# Patient Record
Sex: FEMALE | Race: WHITE | Employment: OTHER | URBAN - METROPOLITAN AREA
[De-identification: names, ages, dates, MRNs, and addresses within clinical notes are randomized per-mention and may not be internally consistent; named-entity substitution may affect disease eponyms.]

---

## 2017-03-27 ENCOUNTER — OFFICE VISIT (OUTPATIENT)
Dept: PULMONOLOGY | Age: 52
End: 2017-03-27
Payer: COMMERCIAL

## 2017-03-27 VITALS
BODY MASS INDEX: 36 KG/M2 | HEART RATE: 101 BPM | SYSTOLIC BLOOD PRESSURE: 157 MMHG | DIASTOLIC BLOOD PRESSURE: 104 MMHG | RESPIRATION RATE: 16 BRPM | HEIGHT: 66 IN | OXYGEN SATURATION: 97 % | TEMPERATURE: 97.5 F | WEIGHT: 224 LBS

## 2017-03-27 DIAGNOSIS — Z85.71 HISTORY OF HODGKIN'S LYMPHOMA: ICD-10-CM

## 2017-03-27 DIAGNOSIS — J45.30 MILD PERSISTENT ASTHMA WITHOUT COMPLICATION: Primary | ICD-10-CM

## 2017-03-27 DIAGNOSIS — Z99.89 OSA ON CPAP: ICD-10-CM

## 2017-03-27 DIAGNOSIS — J84.9 INTERSTITIAL LUNG DISEASE (HCC): ICD-10-CM

## 2017-03-27 DIAGNOSIS — G47.33 OSA ON CPAP: ICD-10-CM

## 2017-03-27 DIAGNOSIS — Z94.84 HX OF STEM CELL TRANSPLANT (HCC): ICD-10-CM

## 2017-03-27 DIAGNOSIS — J45.30 REACTIVE AIRWAY DISEASE, MILD PERSISTENT, UNCOMPLICATED: ICD-10-CM

## 2017-03-27 PROCEDURE — 99213 OFFICE O/P EST LOW 20 MIN: CPT | Performed by: INTERNAL MEDICINE

## 2017-03-27 RX ORDER — ALBUTEROL SULFATE 90 UG/1
2 AEROSOL, METERED RESPIRATORY (INHALATION) EVERY 6 HOURS PRN
Qty: 3 INHALER | Refills: 3 | Status: SHIPPED | OUTPATIENT
Start: 2017-03-27 | End: 2017-04-12 | Stop reason: SDUPTHER

## 2017-03-27 RX ORDER — MONTELUKAST SODIUM 10 MG/1
10 TABLET ORAL DAILY
Qty: 90 TABLET | Refills: 3 | Status: SHIPPED | OUTPATIENT
Start: 2017-03-27 | End: 2017-04-12 | Stop reason: SDUPTHER

## 2017-03-27 RX ORDER — MONTELUKAST SODIUM 10 MG/1
10 TABLET ORAL DAILY
Qty: 30 TABLET | Refills: 11 | Status: SHIPPED | OUTPATIENT
Start: 2017-03-27 | End: 2017-09-08 | Stop reason: SDUPTHER

## 2017-03-27 ASSESSMENT — ENCOUNTER SYMPTOMS
GASTROINTESTINAL NEGATIVE: 1
APNEA: 1
COUGH: 1
EYES NEGATIVE: 1

## 2017-04-12 ENCOUNTER — OFFICE VISIT (OUTPATIENT)
Dept: PRIMARY CARE CLINIC | Age: 52
End: 2017-04-12
Payer: COMMERCIAL

## 2017-04-12 VITALS
OXYGEN SATURATION: 95 % | HEART RATE: 110 BPM | SYSTOLIC BLOOD PRESSURE: 104 MMHG | WEIGHT: 214 LBS | TEMPERATURE: 97.9 F | DIASTOLIC BLOOD PRESSURE: 89 MMHG | BODY MASS INDEX: 34.39 KG/M2 | HEIGHT: 66 IN

## 2017-04-12 DIAGNOSIS — R19.7 DIARRHEA, UNSPECIFIED TYPE: Primary | ICD-10-CM

## 2017-04-12 PROCEDURE — 87505 NFCT AGENT DETECTION GI: CPT | Performed by: FAMILY MEDICINE

## 2017-04-12 PROCEDURE — 87493 C DIFF AMPLIFIED PROBE: CPT | Performed by: FAMILY MEDICINE

## 2017-04-12 PROCEDURE — 99214 OFFICE O/P EST MOD 30 MIN: CPT | Performed by: FAMILY MEDICINE

## 2017-04-12 RX ORDER — IBUPROFEN 200 MG
200 TABLET ORAL EVERY 6 HOURS PRN
COMMUNITY

## 2017-04-12 RX ORDER — CHOLESTYRAMINE 4 G/9G
1 POWDER, FOR SUSPENSION ORAL 3 TIMES DAILY PRN
Qty: 60 PACKET | Refills: 11 | Status: SHIPPED | OUTPATIENT
Start: 2017-04-12 | End: 2017-09-27 | Stop reason: ALTCHOICE

## 2017-04-12 RX ORDER — ASCORBIC ACID 250 MG
1 TABLET,CHEWABLE ORAL DAILY
COMMUNITY

## 2017-04-12 ASSESSMENT — ENCOUNTER SYMPTOMS
RESPIRATORY NEGATIVE: 1
ABDOMINAL PAIN: 1
EYES NEGATIVE: 1
DIARRHEA: 1
CONSTIPATION: 0
VOMITING: 0
ABDOMINAL DISTENTION: 0
BLOOD IN STOOL: 0
NAUSEA: 0

## 2017-04-13 ENCOUNTER — TELEPHONE (OUTPATIENT)
Dept: FAMILY MEDICINE CLINIC | Age: 52
End: 2017-04-13

## 2017-04-13 LAB
DIRECT EXAM: NORMAL
Lab: NORMAL
SPECIMEN DESCRIPTION: NORMAL
STATUS: NORMAL

## 2017-04-14 LAB
CAMPYLOBACTER PCR: NORMAL
SALMONELLA PCR: NORMAL
SHIGATOXIN GENE PCR: NORMAL
SHIGELLA SP PCR: NORMAL
SPECIMEN: NORMAL

## 2017-04-18 DIAGNOSIS — F51.01 PRIMARY INSOMNIA: ICD-10-CM

## 2017-04-18 RX ORDER — ZOLPIDEM TARTRATE 5 MG/1
5 TABLET ORAL NIGHTLY PRN
Qty: 90 TABLET | Refills: 0 | Status: SHIPPED | OUTPATIENT
Start: 2017-04-18 | End: 2017-07-19 | Stop reason: SDUPTHER

## 2017-05-17 ENCOUNTER — OFFICE VISIT (OUTPATIENT)
Dept: FAMILY MEDICINE CLINIC | Age: 52
End: 2017-05-17
Payer: COMMERCIAL

## 2017-05-17 VITALS
HEIGHT: 66 IN | WEIGHT: 223 LBS | RESPIRATION RATE: 16 BRPM | SYSTOLIC BLOOD PRESSURE: 130 MMHG | BODY MASS INDEX: 35.84 KG/M2 | HEART RATE: 86 BPM | DIASTOLIC BLOOD PRESSURE: 86 MMHG

## 2017-05-17 DIAGNOSIS — F33.40 RECURRENT MAJOR DEPRESSIVE DISORDER, IN REMISSION (HCC): Primary | ICD-10-CM

## 2017-05-17 DIAGNOSIS — R73.01 ELEVATED FASTING GLUCOSE: ICD-10-CM

## 2017-05-17 DIAGNOSIS — Z12.31 ENCOUNTER FOR SCREENING MAMMOGRAM FOR BREAST CANCER: ICD-10-CM

## 2017-05-17 DIAGNOSIS — M79.675 PAIN OF LEFT GREAT TOE: ICD-10-CM

## 2017-05-17 DIAGNOSIS — Z23 NEED FOR TDAP VACCINATION: ICD-10-CM

## 2017-05-17 PROCEDURE — 99214 OFFICE O/P EST MOD 30 MIN: CPT | Performed by: FAMILY MEDICINE

## 2017-05-17 RX ORDER — VENLAFAXINE HYDROCHLORIDE 75 MG/1
150 CAPSULE, EXTENDED RELEASE ORAL DAILY
Qty: 180 CAPSULE | Refills: 1 | Status: SHIPPED | OUTPATIENT
Start: 2017-05-17 | End: 2017-12-27 | Stop reason: SDUPTHER

## 2017-06-18 ENCOUNTER — OFFICE VISIT (OUTPATIENT)
Dept: PRIMARY CARE CLINIC | Age: 52
End: 2017-06-18
Payer: COMMERCIAL

## 2017-06-18 VITALS
WEIGHT: 224 LBS | HEART RATE: 113 BPM | BODY MASS INDEX: 36 KG/M2 | SYSTOLIC BLOOD PRESSURE: 136 MMHG | HEIGHT: 66 IN | TEMPERATURE: 97.6 F | DIASTOLIC BLOOD PRESSURE: 86 MMHG | OXYGEN SATURATION: 96 %

## 2017-06-18 DIAGNOSIS — J40 BRONCHITIS: Primary | ICD-10-CM

## 2017-06-18 PROCEDURE — 99213 OFFICE O/P EST LOW 20 MIN: CPT | Performed by: NURSE PRACTITIONER

## 2017-06-18 RX ORDER — PREDNISONE 20 MG/1
20 TABLET ORAL DAILY
Qty: 5 TABLET | Refills: 0 | Status: SHIPPED | OUTPATIENT
Start: 2017-06-18 | End: 2017-06-23

## 2017-06-18 RX ORDER — AZITHROMYCIN 250 MG/1
TABLET, FILM COATED ORAL
Qty: 1 PACKET | Refills: 0 | Status: SHIPPED | OUTPATIENT
Start: 2017-06-18 | End: 2017-06-23

## 2017-06-18 RX ORDER — GUAIFENESIN AND CODEINE PHOSPHATE 100; 10 MG/5ML; MG/5ML
10 SOLUTION ORAL 4 TIMES DAILY PRN
Qty: 180 ML | Refills: 0 | Status: SHIPPED | OUTPATIENT
Start: 2017-06-18 | End: 2017-06-25

## 2017-06-18 ASSESSMENT — ENCOUNTER SYMPTOMS
WHEEZING: 1
RHINORRHEA: 1
SORE THROAT: 1
SHORTNESS OF BREATH: 1
COUGH: 1

## 2017-07-19 DIAGNOSIS — F51.01 PRIMARY INSOMNIA: Primary | ICD-10-CM

## 2017-07-19 RX ORDER — ZOLPIDEM TARTRATE 5 MG/1
5 TABLET ORAL NIGHTLY PRN
Qty: 90 TABLET | Refills: 0 | Status: SHIPPED | OUTPATIENT
Start: 2017-07-19 | End: 2017-10-17 | Stop reason: SDUPTHER

## 2017-09-04 DIAGNOSIS — E78.2 MIXED HYPERLIPIDEMIA: ICD-10-CM

## 2017-09-05 ENCOUNTER — TELEPHONE (OUTPATIENT)
Dept: FAMILY MEDICINE CLINIC | Age: 52
End: 2017-09-05

## 2017-09-05 RX ORDER — ATORVASTATIN CALCIUM 80 MG/1
80 TABLET, FILM COATED ORAL DAILY
Qty: 30 TABLET | Refills: 0 | Status: SHIPPED | OUTPATIENT
Start: 2017-09-05 | End: 2017-09-27 | Stop reason: SDUPTHER

## 2017-09-06 ENCOUNTER — HOSPITAL ENCOUNTER (OUTPATIENT)
Dept: GENERAL RADIOLOGY | Age: 52
Discharge: HOME | End: 2017-09-06
Payer: COMMERCIAL

## 2017-09-06 ENCOUNTER — OFFICE VISIT (OUTPATIENT)
Dept: PRIMARY CARE CLINIC | Age: 52
End: 2017-09-06
Payer: COMMERCIAL

## 2017-09-06 VITALS
HEART RATE: 107 BPM | BODY MASS INDEX: 37.19 KG/M2 | OXYGEN SATURATION: 94 % | RESPIRATION RATE: 12 BRPM | WEIGHT: 231.4 LBS | TEMPERATURE: 98.1 F | HEIGHT: 66 IN | DIASTOLIC BLOOD PRESSURE: 84 MMHG | SYSTOLIC BLOOD PRESSURE: 124 MMHG

## 2017-09-06 DIAGNOSIS — R07.89 CHEST WALL PAIN: Primary | ICD-10-CM

## 2017-09-06 DIAGNOSIS — R07.9 CHEST PAIN, UNSPECIFIED TYPE: ICD-10-CM

## 2017-09-06 PROCEDURE — 99214 OFFICE O/P EST MOD 30 MIN: CPT | Performed by: FAMILY MEDICINE

## 2017-09-06 PROCEDURE — 93000 ELECTROCARDIOGRAM COMPLETE: CPT | Performed by: FAMILY MEDICINE

## 2017-09-06 PROCEDURE — 71020 XR CHEST STANDARD TWO VW: CPT

## 2017-09-06 RX ORDER — IBUPROFEN 800 MG/1
800 TABLET ORAL EVERY 8 HOURS PRN
Qty: 60 TABLET | Refills: 0 | Status: SHIPPED | OUTPATIENT
Start: 2017-09-06 | End: 2017-09-27

## 2017-09-06 ASSESSMENT — PATIENT HEALTH QUESTIONNAIRE - PHQ9
SUM OF ALL RESPONSES TO PHQ QUESTIONS 1-9: 0
2. FEELING DOWN, DEPRESSED OR HOPELESS: 0
1. LITTLE INTEREST OR PLEASURE IN DOING THINGS: 0
SUM OF ALL RESPONSES TO PHQ9 QUESTIONS 1 & 2: 0

## 2017-09-08 ENCOUNTER — OFFICE VISIT (OUTPATIENT)
Dept: PULMONOLOGY | Age: 52
End: 2017-09-08
Payer: COMMERCIAL

## 2017-09-08 VITALS
WEIGHT: 227 LBS | DIASTOLIC BLOOD PRESSURE: 89 MMHG | OXYGEN SATURATION: 96 % | SYSTOLIC BLOOD PRESSURE: 131 MMHG | TEMPERATURE: 97.1 F | RESPIRATION RATE: 16 BRPM | HEART RATE: 125 BPM | BODY MASS INDEX: 36.48 KG/M2 | HEIGHT: 66 IN

## 2017-09-08 DIAGNOSIS — G47.33 OSA ON CPAP: ICD-10-CM

## 2017-09-08 DIAGNOSIS — Z85.71 HISTORY OF HODGKIN'S LYMPHOMA: ICD-10-CM

## 2017-09-08 DIAGNOSIS — J45.30 MILD PERSISTENT ASTHMA WITHOUT COMPLICATION: ICD-10-CM

## 2017-09-08 DIAGNOSIS — Z99.89 OSA ON CPAP: ICD-10-CM

## 2017-09-08 DIAGNOSIS — G89.12 POST-THORACOTOMY PAIN: ICD-10-CM

## 2017-09-08 DIAGNOSIS — Z94.84 HX OF STEM CELL TRANSPLANT (HCC): ICD-10-CM

## 2017-09-08 DIAGNOSIS — J45.30 REACTIVE AIRWAY DISEASE, MILD PERSISTENT, UNCOMPLICATED: Primary | ICD-10-CM

## 2017-09-08 PROCEDURE — 99213 OFFICE O/P EST LOW 20 MIN: CPT | Performed by: INTERNAL MEDICINE

## 2017-09-08 RX ORDER — MONTELUKAST SODIUM 10 MG/1
10 TABLET ORAL DAILY
Qty: 90 TABLET | Refills: 3 | Status: SHIPPED | OUTPATIENT
Start: 2017-09-08 | End: 2018-10-10 | Stop reason: SDUPTHER

## 2017-09-10 ASSESSMENT — ENCOUNTER SYMPTOMS
EYES NEGATIVE: 1
WHEEZING: 1
GASTROINTESTINAL NEGATIVE: 1
COUGH: 1

## 2017-09-18 ENCOUNTER — OFFICE VISIT (OUTPATIENT)
Dept: PRIMARY CARE CLINIC | Age: 52
End: 2017-09-18
Payer: COMMERCIAL

## 2017-09-18 VITALS
TEMPERATURE: 97.2 F | HEART RATE: 101 BPM | RESPIRATION RATE: 18 BRPM | DIASTOLIC BLOOD PRESSURE: 88 MMHG | SYSTOLIC BLOOD PRESSURE: 124 MMHG | HEIGHT: 66 IN | WEIGHT: 228.4 LBS | BODY MASS INDEX: 36.71 KG/M2 | OXYGEN SATURATION: 98 %

## 2017-09-18 DIAGNOSIS — J40 BRONCHITIS: Primary | ICD-10-CM

## 2017-09-18 PROCEDURE — 99213 OFFICE O/P EST LOW 20 MIN: CPT | Performed by: NURSE PRACTITIONER

## 2017-09-18 RX ORDER — PREDNISONE 20 MG/1
20 TABLET ORAL 2 TIMES DAILY
Qty: 10 TABLET | Refills: 0 | Status: SHIPPED | OUTPATIENT
Start: 2017-09-18 | End: 2017-09-23

## 2017-09-18 RX ORDER — AZITHROMYCIN 250 MG/1
TABLET, FILM COATED ORAL
Qty: 6 TABLET | Refills: 0 | Status: SHIPPED | OUTPATIENT
Start: 2017-09-18 | End: 2017-09-27 | Stop reason: ALTCHOICE

## 2017-09-18 ASSESSMENT — ENCOUNTER SYMPTOMS
SHORTNESS OF BREATH: 1
COUGH: 1
CHEST TIGHTNESS: 1
WHEEZING: 1

## 2017-09-22 ENCOUNTER — TELEPHONE (OUTPATIENT)
Dept: PULMONOLOGY | Age: 52
End: 2017-09-22

## 2017-09-22 DIAGNOSIS — G47.33 OSA (OBSTRUCTIVE SLEEP APNEA): Primary | ICD-10-CM

## 2017-09-26 ENCOUNTER — TELEPHONE (OUTPATIENT)
Dept: FAMILY MEDICINE CLINIC | Age: 52
End: 2017-09-26

## 2017-09-27 ENCOUNTER — OFFICE VISIT (OUTPATIENT)
Dept: FAMILY MEDICINE CLINIC | Age: 52
End: 2017-09-27
Payer: COMMERCIAL

## 2017-09-27 VITALS
RESPIRATION RATE: 16 BRPM | SYSTOLIC BLOOD PRESSURE: 114 MMHG | DIASTOLIC BLOOD PRESSURE: 78 MMHG | BODY MASS INDEX: 36.71 KG/M2 | HEART RATE: 90 BPM | HEIGHT: 66 IN | WEIGHT: 228.4 LBS

## 2017-09-27 DIAGNOSIS — Z23 NEED FOR TDAP VACCINATION: ICD-10-CM

## 2017-09-27 DIAGNOSIS — R73.01 ELEVATED FASTING GLUCOSE: ICD-10-CM

## 2017-09-27 DIAGNOSIS — E78.2 MIXED HYPERLIPIDEMIA: Primary | ICD-10-CM

## 2017-09-27 DIAGNOSIS — M54.50 CHRONIC LEFT-SIDED LOW BACK PAIN WITHOUT SCIATICA: ICD-10-CM

## 2017-09-27 DIAGNOSIS — G89.29 CHRONIC LEFT-SIDED LOW BACK PAIN WITHOUT SCIATICA: ICD-10-CM

## 2017-09-27 LAB
AVERAGE GLUCOSE: NORMAL
HBA1C MFR BLD: 5.8 %

## 2017-09-27 PROCEDURE — 99214 OFFICE O/P EST MOD 30 MIN: CPT | Performed by: FAMILY MEDICINE

## 2017-09-27 RX ORDER — ATORVASTATIN CALCIUM 80 MG/1
80 TABLET, FILM COATED ORAL DAILY
Qty: 30 TABLET | Refills: 6 | Status: SHIPPED | OUTPATIENT
Start: 2017-09-27 | End: 2018-03-25 | Stop reason: SDUPTHER

## 2017-09-28 DIAGNOSIS — R73.01 ELEVATED FASTING GLUCOSE: ICD-10-CM

## 2017-09-28 DIAGNOSIS — E78.2 MIXED HYPERLIPIDEMIA: Primary | ICD-10-CM

## 2017-10-04 ENCOUNTER — TELEPHONE (OUTPATIENT)
Dept: PULMONOLOGY | Age: 52
End: 2017-10-04

## 2017-10-04 DIAGNOSIS — G47.33 OSA (OBSTRUCTIVE SLEEP APNEA): Primary | ICD-10-CM

## 2017-10-04 DIAGNOSIS — G47.31 COMPLEX SLEEP APNEA SYNDROME: ICD-10-CM

## 2017-10-04 DIAGNOSIS — J84.9 INTERSTITIAL LUNG DISEASE (HCC): ICD-10-CM

## 2017-10-04 DIAGNOSIS — J45.30 MILD PERSISTENT ASTHMA WITHOUT COMPLICATION: ICD-10-CM

## 2017-10-17 DIAGNOSIS — F51.01 PRIMARY INSOMNIA: Primary | ICD-10-CM

## 2017-10-17 RX ORDER — ZOLPIDEM TARTRATE 5 MG/1
5 TABLET ORAL NIGHTLY PRN
Qty: 90 TABLET | Refills: 0 | Status: SHIPPED | OUTPATIENT
Start: 2017-10-17 | End: 2017-12-27 | Stop reason: SDUPTHER

## 2017-10-24 NOTE — TELEPHONE ENCOUNTER
Pt calling stating she only has 2 ambien left and [facility] until [facility], can you send some locally. Pt uses above pharmacy, please advise at above number.

## 2017-10-25 NOTE — TELEPHONE ENCOUNTER
Patient was called,she had not received her supply of [facility] from [facility]. Explained that according to [facility] this was filled on 10/20/17. She will contact them and find out when she will receive.

## 2017-12-27 ENCOUNTER — HOSPITAL ENCOUNTER (OUTPATIENT)
Dept: GENERAL RADIOLOGY | Age: 52
Discharge: HOME | End: 2017-12-27
Payer: COMMERCIAL

## 2017-12-27 ENCOUNTER — OFFICE VISIT (OUTPATIENT)
Dept: FAMILY MEDICINE CLINIC | Age: 52
End: 2017-12-27
Payer: COMMERCIAL

## 2017-12-27 VITALS
BODY MASS INDEX: 35.2 KG/M2 | DIASTOLIC BLOOD PRESSURE: 70 MMHG | HEART RATE: 88 BPM | RESPIRATION RATE: 16 BRPM | WEIGHT: 219 LBS | HEIGHT: 66 IN | SYSTOLIC BLOOD PRESSURE: 102 MMHG

## 2017-12-27 DIAGNOSIS — Z23 NEED FOR SHINGLES VACCINE: ICD-10-CM

## 2017-12-27 DIAGNOSIS — M25.561 ACUTE PAIN OF RIGHT KNEE: ICD-10-CM

## 2017-12-27 DIAGNOSIS — F51.01 PRIMARY INSOMNIA: ICD-10-CM

## 2017-12-27 DIAGNOSIS — R68.82 DECREASED LIBIDO: ICD-10-CM

## 2017-12-27 DIAGNOSIS — Z23 NEED FOR TDAP VACCINATION: ICD-10-CM

## 2017-12-27 DIAGNOSIS — F33.40 RECURRENT MAJOR DEPRESSIVE DISORDER, IN REMISSION (HCC): Primary | ICD-10-CM

## 2017-12-27 PROCEDURE — 99214 OFFICE O/P EST MOD 30 MIN: CPT | Performed by: FAMILY MEDICINE

## 2017-12-27 PROCEDURE — 73562 X-RAY EXAM OF KNEE 3: CPT

## 2017-12-27 RX ORDER — ZOLPIDEM TARTRATE 5 MG/1
5 TABLET ORAL NIGHTLY PRN
Qty: 90 TABLET | Refills: 0 | Status: SHIPPED | OUTPATIENT
Start: 2017-12-27 | End: 2018-04-04 | Stop reason: SDUPTHER

## 2017-12-27 RX ORDER — ESTERIFIED ESTROGEN AND METHYLTESTOSTERONE .625; 1.25 MG/1; MG/1
1 TABLET ORAL DAILY
Qty: 90 TABLET | Refills: 1 | Status: SHIPPED | OUTPATIENT
Start: 2017-12-27 | End: 2018-01-17 | Stop reason: SDUPTHER

## 2017-12-27 RX ORDER — VENLAFAXINE HYDROCHLORIDE 75 MG/1
150 CAPSULE, EXTENDED RELEASE ORAL DAILY
Qty: 180 CAPSULE | Refills: 1 | Status: SHIPPED | OUTPATIENT
Start: 2017-12-27 | End: 2018-08-02 | Stop reason: SDUPTHER

## 2017-12-27 NOTE — PROGRESS NOTES
[address], [address] [place] [address] Drive                        Telephone [phone]             Fax [phone]     Jabari Dardenr  1965  MRN:  [id number]  Date of visit:  12/27/17    Subjective:    Jabari Courser is a 47 y.o.  female who presents to [facility] today (12/27/17) for follow up/evaluation of: Insomnia (3 month follow up) and Depression (3 month follow up)      Overall, she states that she is doing well. She states that Effexor is working well for her. She also feels that she is doing well with Ambien. She tries not to take Ambien every evening. She dose have concerns about decreased libido. She states that this has been an issue for several years. She was treated for lymphoma in 2005. She states that she that her libido never recovered after going through chemotherapy. She is status post LEANNE-BSO in 2010. She has the following problem list:  Patient Active Problem List   Diagnosis    Interstitial lung disease ([address]    Depression    Reactive airway disease    Tachycardia    Obesity (BMI 30-39. 9)    Post-thoracotomy pain syndrome    Hx [facility] transplant [facility]    Mixed hyperlipidemia    History of Hodgkin's lymphoma    History of DVT (deep vein thrombosis)    Primary insomnia    Neuropathy due to chemotherapeutic drug ([address]    High risk medication use    Myopia of both eyes with astigmatism    Elevated fasting glucose    Rectal bleeding    Ischemic colitis ([address]        Current medications are:  Outpatient Prescriptions Marked as Taking for the 12/27/17 encounter (Office Visit) with Bassem Hodge MD   Medication Sig Dispense Refill    zolpidem (AMBIEN) 5 MG tablet Take 1 tablet by mouth nightly as needed for Sleep 90 tablet 0    atorvastatin (LIPITOR) 80 MG tablet Take 1 tablet by mouth daily 30 tablet 6    UNABLE TO FIND Massage

## 2017-12-30 ENCOUNTER — HOSPITAL ENCOUNTER (OUTPATIENT)
Age: 52
Setting detail: SPECIMEN
Discharge: HOME | End: 2017-12-30
Payer: COMMERCIAL

## 2017-12-30 ENCOUNTER — OFFICE VISIT (OUTPATIENT)
Dept: PRIMARY CARE CLINIC | Age: 52
End: 2017-12-30
Payer: COMMERCIAL

## 2017-12-30 VITALS
WEIGHT: 225.8 LBS | SYSTOLIC BLOOD PRESSURE: 124 MMHG | HEIGHT: 65 IN | HEART RATE: 80 BPM | BODY MASS INDEX: 37.62 KG/M2 | TEMPERATURE: 97.6 F | DIASTOLIC BLOOD PRESSURE: 80 MMHG

## 2017-12-30 DIAGNOSIS — R30.0 DYSURIA: ICD-10-CM

## 2017-12-30 DIAGNOSIS — N30.01 ACUTE CYSTITIS WITH HEMATURIA: Primary | ICD-10-CM

## 2017-12-30 LAB
-: ABNORMAL
AMORPHOUS: ABNORMAL
BACTERIA: ABNORMAL
BILIRUBIN URINE: ABNORMAL
CASTS UA: ABNORMAL /LPF (ref 0–2)
COLOR: ABNORMAL
COMMENT UA: ABNORMAL
CRYSTALS, UA: ABNORMAL /HPF
EPITHELIAL CELLS UA: ABNORMAL /HPF (ref 0–5)
GLUCOSE URINE: NEGATIVE
KETONES, URINE: NEGATIVE
LEUKOCYTE ESTERASE, URINE: ABNORMAL
MUCUS: ABNORMAL
NITRITE, URINE: POSITIVE
OTHER OBSERVATIONS UA: ABNORMAL
PH UA: 5.5 (ref 5–6)
PROTEIN UA: ABNORMAL
RBC UA: >100 /HPF (ref 0–4)
RENAL EPITHELIAL, UA: ABNORMAL /HPF
SPECIFIC GRAVITY UA: 1.02 (ref 1.01–1.02)
TRICHOMONAS: ABNORMAL
TURBIDITY: ABNORMAL
URINE HGB: ABNORMAL
UROBILINOGEN, URINE: NORMAL
WBC UA: >50 /HPF (ref 0–4)
YEAST: ABNORMAL

## 2017-12-30 PROCEDURE — 99213 OFFICE O/P EST LOW 20 MIN: CPT | Performed by: PHYSICIAN ASSISTANT

## 2017-12-30 PROCEDURE — 81001 URINALYSIS AUTO W/SCOPE: CPT

## 2017-12-30 PROCEDURE — 87086 URINE CULTURE/COLONY COUNT: CPT

## 2017-12-30 RX ORDER — PHENAZOPYRIDINE HYDROCHLORIDE 200 MG/1
200 TABLET, FILM COATED ORAL 3 TIMES DAILY PRN
Qty: 6 TABLET | Refills: 0 | Status: SHIPPED | OUTPATIENT
Start: 2017-12-30 | End: 2018-01-02

## 2017-12-30 RX ORDER — NITROFURANTOIN 25; 75 MG/1; MG/1
100 CAPSULE ORAL 2 TIMES DAILY
Qty: 10 CAPSULE | Refills: 0 | Status: SHIPPED | OUTPATIENT
Start: 2017-12-30 | End: 2018-01-04

## 2017-12-30 ASSESSMENT — ENCOUNTER SYMPTOMS
RESPIRATORY NEGATIVE: 1
VOMITING: 0
NAUSEA: 0

## 2017-12-30 NOTE — PATIENT INSTRUCTIONS
from front to back. When should you call for help? Call your doctor now or seek immediate medical care if:  ? · Symptoms such as fever, chills, nausea, or vomiting get worse or appear for the first time. ? · You have new pain in your back just below your rib cage. This is called flank pain. ? · There is new blood or pus in your urine. ? · You have any problems with your antibiotic medicine. ? Watch closely for changes in your health, and be sure to contact your doctor if:  ? · You are not getting better after taking an antibiotic for 2 days. ? · Your symptoms go away but then come back. Where can you learn more? Go to https://Blekko.Identified. org and sign in to your Go Try It On account. Enter O677 in the Seemage box to learn more about \"Urinary Tract Infection in Women: Care Instructions. \"     If you do not have an account, please click on the \"Sign Up Now\" link. Current as of: May 12, 2017  Content Version: 11.4  © 6680-0962 Healthwise, Incorporated. Care instructions adapted under license by [facility] ([facility]). If you have questions about a medical condition or this instruction, always ask your healthcare professional. [place] [address] any warranty or liability for your use of this information.

## 2017-12-30 NOTE — PROGRESS NOTES
>50  0 - 4 /HPF Final    RBC, UA 12/30/2017 >100  0 - 4 /HPF Final    Casts UA 12/30/2017 NOT REPORTED  0 - 2 /LPF Final    Crystals UA 12/30/2017 NOT REPORTED  NONE /HPF Final    Epithelial Cells UA 12/30/2017 0 TO 4  0 - 5 /HPF Final    Renal Epithelial, Urine 12/30/2017 NOT REPORTED  0 /HPF Final    Bacteria, UA 12/30/2017 2+* NONE Final    Mucus, UA 12/30/2017 NOT REPORTED  NONE Final    Trichomonas, UA 12/30/2017 NOT REPORTED  NONE Final    Amorphous, UA 12/30/2017 1+* NONE Final    Other Observations UA 12/30/2017 Specimen Cultured* NREQ Final    Comment: Performed at [facility] [address] [address] [phone], UA 12/30/2017 NOT REPORTED  NONE Final       Assessment:      1. Acute cystitis with hematuria    2. Dysuria          Plan:      Culture urine. Macrobid 100 mg bid x 5 days. Push fluids. Pyridium PRN dysuria. Supportive care advised. Follow-up PRN/as planned with PCP.

## 2018-01-01 LAB
CULTURE: NORMAL
CULTURE: NORMAL
Lab: NORMAL
SPECIMEN DESCRIPTION: NORMAL
SPECIMEN DESCRIPTION: NORMAL
STATUS: NORMAL

## 2018-01-17 DIAGNOSIS — R68.82 DECREASED LIBIDO: ICD-10-CM

## 2018-01-17 RX ORDER — ESTERIFIED ESTROGEN AND METHYLTESTOSTERONE .625; 1.25 MG/1; MG/1
1 TABLET ORAL DAILY
Qty: 90 TABLET | Refills: 1 | Status: SHIPPED | OUTPATIENT
Start: 2018-01-17 | End: 2018-04-04 | Stop reason: SDUPTHER

## 2018-01-17 NOTE — TELEPHONE ENCOUNTER
Medication was sent in on 12/26/17 but \"E-Prescribing Status: Verification status not available for this order\". [facility] states [facility] the order.

## 2018-03-25 DIAGNOSIS — E78.2 MIXED HYPERLIPIDEMIA: ICD-10-CM

## 2018-03-27 RX ORDER — ATORVASTATIN CALCIUM 80 MG/1
TABLET, FILM COATED ORAL
Qty: 90 TABLET | Refills: 0 | Status: SHIPPED | OUTPATIENT
Start: 2018-03-27 | End: 2018-06-25 | Stop reason: SDUPTHER

## 2018-03-28 ENCOUNTER — HOSPITAL ENCOUNTER (OUTPATIENT)
Dept: LAB | Age: 53
Setting detail: SPECIMEN
Discharge: HOME | End: 2018-03-28
Payer: COMMERCIAL

## 2018-03-28 DIAGNOSIS — R73.01 ELEVATED FASTING GLUCOSE: ICD-10-CM

## 2018-03-28 DIAGNOSIS — E78.2 MIXED HYPERLIPIDEMIA: ICD-10-CM

## 2018-03-28 LAB
ALBUMIN SERPL-MCNC: 4.1 G/DL (ref 3.5–5.2)
ALBUMIN/GLOBULIN RATIO: 1.3 (ref 1–2.5)
ALP BLD-CCNC: 84 U/L (ref 35–104)
ALT SERPL-CCNC: 25 U/L (ref 5–33)
ANION GAP SERPL CALCULATED.3IONS-SCNC: 11 MMOL/L (ref 9–17)
AST SERPL-CCNC: 25 U/L
BILIRUB SERPL-MCNC: 0.46 MG/DL (ref 0.3–1.2)
BUN BLDV-MCNC: 14 MG/DL (ref 6–20)
BUN/CREAT BLD: 20 (ref 9–20)
CALCIUM SERPL-MCNC: 9 MG/DL (ref 8.6–10.4)
CHLORIDE BLD-SCNC: 100 MMOL/L (ref 98–107)
CHOLESTEROL, FASTING: 183 MG/DL
CHOLESTEROL/HDL RATIO: 4.7
CO2: 30 MMOL/L (ref 20–31)
CREAT SERPL-MCNC: 0.71 MG/DL (ref 0.5–0.9)
ESTIMATED AVERAGE GLUCOSE: 123 MG/DL
GFR AFRICAN AMERICAN: >60 ML/MIN
GFR NON-AFRICAN AMERICAN: >60 ML/MIN
GFR SERPL CREATININE-BSD FRML MDRD: ABNORMAL ML/MIN/{1.73_M2}
GFR SERPL CREATININE-BSD FRML MDRD: ABNORMAL ML/MIN/{1.73_M2}
GLUCOSE BLD-MCNC: 129 MG/DL (ref 70–99)
HBA1C MFR BLD: 5.9 % (ref 4.8–5.9)
HDLC SERPL-MCNC: 39 MG/DL
LDL CHOLESTEROL: 81 MG/DL (ref 0–130)
POTASSIUM SERPL-SCNC: 4.2 MMOL/L (ref 3.7–5.3)
SODIUM BLD-SCNC: 141 MMOL/L (ref 135–144)
TOTAL PROTEIN: 7.2 G/DL (ref 6.4–8.3)
TRIGLYCERIDE, FASTING: 317 MG/DL
VLDLC SERPL CALC-MCNC: ABNORMAL MG/DL (ref 1–30)

## 2018-03-28 PROCEDURE — 80061 LIPID PANEL: CPT

## 2018-03-28 PROCEDURE — 80053 COMPREHEN METABOLIC PANEL: CPT

## 2018-03-28 PROCEDURE — 36415 COLL VENOUS BLD VENIPUNCTURE: CPT

## 2018-03-28 PROCEDURE — 83036 HEMOGLOBIN GLYCOSYLATED A1C: CPT

## 2018-04-02 ENCOUNTER — OFFICE VISIT (OUTPATIENT)
Dept: PULMONOLOGY | Age: 53
End: 2018-04-02
Payer: COMMERCIAL

## 2018-04-02 VITALS
RESPIRATION RATE: 14 BRPM | BODY MASS INDEX: 37.65 KG/M2 | DIASTOLIC BLOOD PRESSURE: 92 MMHG | HEART RATE: 104 BPM | WEIGHT: 226 LBS | TEMPERATURE: 98 F | OXYGEN SATURATION: 97 % | HEIGHT: 65 IN | SYSTOLIC BLOOD PRESSURE: 129 MMHG

## 2018-04-02 DIAGNOSIS — Z85.71 HISTORY OF HODGKIN'S LYMPHOMA: ICD-10-CM

## 2018-04-02 DIAGNOSIS — Z94.84 HX OF STEM CELL TRANSPLANT (HCC): ICD-10-CM

## 2018-04-02 DIAGNOSIS — J45.30 MILD PERSISTENT ASTHMA WITHOUT COMPLICATION: ICD-10-CM

## 2018-04-02 DIAGNOSIS — Z99.89 OSA ON CPAP: Primary | ICD-10-CM

## 2018-04-02 DIAGNOSIS — G89.12 POST-THORACOTOMY PAIN: ICD-10-CM

## 2018-04-02 DIAGNOSIS — G47.33 OSA ON CPAP: Primary | ICD-10-CM

## 2018-04-02 PROCEDURE — 99213 OFFICE O/P EST LOW 20 MIN: CPT | Performed by: INTERNAL MEDICINE

## 2018-04-02 ASSESSMENT — ENCOUNTER SYMPTOMS
EYES NEGATIVE: 1
GASTROINTESTINAL NEGATIVE: 1
RESPIRATORY NEGATIVE: 1

## 2018-04-04 ENCOUNTER — OFFICE VISIT (OUTPATIENT)
Dept: FAMILY MEDICINE CLINIC | Age: 53
End: 2018-04-04
Payer: COMMERCIAL

## 2018-04-04 VITALS
BODY MASS INDEX: 35.9 KG/M2 | HEART RATE: 86 BPM | RESPIRATION RATE: 16 BRPM | DIASTOLIC BLOOD PRESSURE: 78 MMHG | HEIGHT: 66 IN | WEIGHT: 223.4 LBS | SYSTOLIC BLOOD PRESSURE: 110 MMHG

## 2018-04-04 DIAGNOSIS — F51.01 PRIMARY INSOMNIA: Primary | ICD-10-CM

## 2018-04-04 DIAGNOSIS — R68.82 DECREASED LIBIDO: ICD-10-CM

## 2018-04-04 DIAGNOSIS — Z23 NEED FOR TDAP VACCINATION: ICD-10-CM

## 2018-04-04 DIAGNOSIS — R73.01 ELEVATED FASTING GLUCOSE: ICD-10-CM

## 2018-04-04 DIAGNOSIS — E78.2 MIXED HYPERLIPIDEMIA: ICD-10-CM

## 2018-04-04 DIAGNOSIS — Z23 NEED FOR SHINGLES VACCINE: ICD-10-CM

## 2018-04-04 PROCEDURE — 99214 OFFICE O/P EST MOD 30 MIN: CPT | Performed by: FAMILY MEDICINE

## 2018-04-04 RX ORDER — ZOLPIDEM TARTRATE 5 MG/1
5 TABLET ORAL NIGHTLY PRN
Qty: 90 TABLET | Refills: 0 | Status: SHIPPED | OUTPATIENT
Start: 2018-04-04 | End: 2018-07-13 | Stop reason: SDUPTHER

## 2018-04-04 RX ORDER — ESTERIFIED ESTROGEN AND METHYLTESTOSTERONE .625; 1.25 MG/1; MG/1
1 TABLET ORAL DAILY
Qty: 90 TABLET | Refills: 1 | Status: SHIPPED | OUTPATIENT
Start: 2018-04-04 | End: 2018-09-05 | Stop reason: SDUPTHER

## 2018-05-12 ENCOUNTER — HOSPITAL ENCOUNTER (OUTPATIENT)
Age: 53
Setting detail: SPECIMEN
Discharge: HOME | End: 2018-05-12
Payer: COMMERCIAL

## 2018-05-12 ENCOUNTER — OFFICE VISIT (OUTPATIENT)
Dept: PRIMARY CARE CLINIC | Age: 53
End: 2018-05-12
Payer: COMMERCIAL

## 2018-05-12 VITALS
OXYGEN SATURATION: 98 % | TEMPERATURE: 98.2 F | HEART RATE: 110 BPM | HEIGHT: 65 IN | WEIGHT: 228 LBS | SYSTOLIC BLOOD PRESSURE: 126 MMHG | BODY MASS INDEX: 37.99 KG/M2 | DIASTOLIC BLOOD PRESSURE: 88 MMHG

## 2018-05-12 DIAGNOSIS — R30.0 DYSURIA: ICD-10-CM

## 2018-05-12 DIAGNOSIS — R30.0 DYSURIA: Primary | ICD-10-CM

## 2018-05-12 DIAGNOSIS — N30.01 ACUTE CYSTITIS WITH HEMATURIA: ICD-10-CM

## 2018-05-12 LAB
-: ABNORMAL
AMORPHOUS: ABNORMAL
BACTERIA: ABNORMAL
BILIRUBIN URINE: ABNORMAL
CASTS UA: ABNORMAL /LPF (ref 0–2)
COLOR: ABNORMAL
COMMENT UA: ABNORMAL
CRYSTALS, UA: ABNORMAL /HPF
EPITHELIAL CELLS UA: ABNORMAL /HPF (ref 0–5)
GLUCOSE URINE: ABNORMAL
KETONES, URINE: NEGATIVE
LEUKOCYTE ESTERASE, URINE: ABNORMAL
MUCUS: ABNORMAL
NITRITE, URINE: POSITIVE
OTHER OBSERVATIONS UA: ABNORMAL
PH UA: 5.5 (ref 5–6)
PROTEIN UA: ABNORMAL
RBC UA: ABNORMAL /HPF (ref 0–4)
RENAL EPITHELIAL, UA: ABNORMAL /HPF
SPECIFIC GRAVITY UA: 1.03 (ref 1.01–1.02)
TRICHOMONAS: ABNORMAL
TURBIDITY: ABNORMAL
URINE HGB: ABNORMAL
UROBILINOGEN, URINE: ABNORMAL
WBC UA: ABNORMAL /HPF (ref 0–4)
YEAST: ABNORMAL

## 2018-05-12 PROCEDURE — 87186 SC STD MICRODIL/AGAR DIL: CPT

## 2018-05-12 PROCEDURE — 87077 CULTURE AEROBIC IDENTIFY: CPT

## 2018-05-12 PROCEDURE — 81001 URINALYSIS AUTO W/SCOPE: CPT

## 2018-05-12 PROCEDURE — 99213 OFFICE O/P EST LOW 20 MIN: CPT | Performed by: FAMILY MEDICINE

## 2018-05-12 PROCEDURE — 87086 URINE CULTURE/COLONY COUNT: CPT

## 2018-05-12 RX ORDER — NITROFURANTOIN 25; 75 MG/1; MG/1
100 CAPSULE ORAL 2 TIMES DAILY
Qty: 14 CAPSULE | Refills: 0 | Status: SHIPPED | OUTPATIENT
Start: 2018-05-12 | End: 2018-05-19

## 2018-05-12 ASSESSMENT — ENCOUNTER SYMPTOMS
RESPIRATORY NEGATIVE: 1
GASTROINTESTINAL NEGATIVE: 1
EYES NEGATIVE: 1
ALLERGIC/IMMUNOLOGIC NEGATIVE: 1

## 2018-05-14 LAB
CULTURE: ABNORMAL
CULTURE: ABNORMAL
Lab: ABNORMAL
ORGANISM: ABNORMAL
SPECIMEN DESCRIPTION: ABNORMAL
SPECIMEN DESCRIPTION: ABNORMAL
STATUS: ABNORMAL

## 2018-06-25 DIAGNOSIS — E78.2 MIXED HYPERLIPIDEMIA: ICD-10-CM

## 2018-06-26 RX ORDER — ATORVASTATIN CALCIUM 80 MG/1
80 TABLET, FILM COATED ORAL DAILY
Qty: 90 TABLET | Refills: 0 | Status: SHIPPED | OUTPATIENT
Start: 2018-06-26 | End: 2018-10-10 | Stop reason: SDUPTHER

## 2018-06-29 DIAGNOSIS — Z12.31 SCREENING MAMMOGRAM, ENCOUNTER FOR: Primary | ICD-10-CM

## 2018-07-13 DIAGNOSIS — F51.01 PRIMARY INSOMNIA: ICD-10-CM

## 2018-07-13 NOTE — TELEPHONE ENCOUNTER
[place] from [place] and [place] reviewed, last filled 4/5/18 #90 for a 90 day supply. Report available for your review. Last OV 4/4/18; next OV 8/7/18.

## 2018-07-14 ENCOUNTER — HOSPITAL ENCOUNTER (OUTPATIENT)
Dept: MAMMOGRAPHY | Age: 53
Discharge: HOME | End: 2018-07-16
Payer: COMMERCIAL

## 2018-07-14 DIAGNOSIS — Z12.31 SCREENING MAMMOGRAM, ENCOUNTER FOR: ICD-10-CM

## 2018-07-14 PROCEDURE — 77067 SCR MAMMO BI INCL CAD: CPT

## 2018-07-15 RX ORDER — ZOLPIDEM TARTRATE 5 MG/1
5 TABLET ORAL NIGHTLY PRN
Qty: 90 TABLET | Refills: 0 | Status: SHIPPED | OUTPATIENT
Start: 2018-07-15 | End: 2018-10-10 | Stop reason: SDUPTHER

## 2018-07-15 NOTE — TELEPHONE ENCOUNTER
Controlled substances monitoring: No signs of potential drug abuse or diversion identified when the [place] report from [place], and [place] was reviewed today. The activity on the report was consistent with the treatment plan. Also, I will be out of the office on 8/7/2018. That appointment will need to be changed.

## 2018-08-02 DIAGNOSIS — F33.40 RECURRENT MAJOR DEPRESSIVE DISORDER, IN REMISSION (HCC): ICD-10-CM

## 2018-08-02 RX ORDER — VENLAFAXINE HYDROCHLORIDE 75 MG/1
150 CAPSULE, EXTENDED RELEASE ORAL DAILY
Qty: 180 CAPSULE | Refills: 1 | Status: SHIPPED | OUTPATIENT
Start: 2018-08-02 | End: 2019-01-09 | Stop reason: SDUPTHER

## 2018-08-31 ENCOUNTER — HOSPITAL ENCOUNTER (OUTPATIENT)
Dept: LAB | Age: 53
Setting detail: SPECIMEN
Discharge: HOME | End: 2018-08-31
Payer: COMMERCIAL

## 2018-08-31 DIAGNOSIS — R73.01 ELEVATED FASTING GLUCOSE: ICD-10-CM

## 2018-08-31 DIAGNOSIS — E78.2 MIXED HYPERLIPIDEMIA: ICD-10-CM

## 2018-08-31 LAB
ALBUMIN SERPL-MCNC: 4 G/DL (ref 3.5–5.2)
ALBUMIN/GLOBULIN RATIO: 1.3 (ref 1–2.5)
ALP BLD-CCNC: 65 U/L (ref 35–104)
ALT SERPL-CCNC: 24 U/L (ref 5–33)
ANION GAP SERPL CALCULATED.3IONS-SCNC: 9 MMOL/L (ref 9–17)
AST SERPL-CCNC: 26 U/L
BILIRUB SERPL-MCNC: 0.37 MG/DL (ref 0.3–1.2)
BUN BLDV-MCNC: 8 MG/DL (ref 6–20)
BUN/CREAT BLD: 12 (ref 9–20)
CALCIUM SERPL-MCNC: 8.5 MG/DL (ref 8.6–10.4)
CHLORIDE BLD-SCNC: 100 MMOL/L (ref 98–107)
CHOLESTEROL/HDL RATIO: 4.2
CHOLESTEROL: 134 MG/DL
CO2: 30 MMOL/L (ref 20–31)
CREAT SERPL-MCNC: 0.69 MG/DL (ref 0.5–0.9)
ESTIMATED AVERAGE GLUCOSE: 120 MG/DL
GFR AFRICAN AMERICAN: >60 ML/MIN
GFR NON-AFRICAN AMERICAN: >60 ML/MIN
GFR SERPL CREATININE-BSD FRML MDRD: ABNORMAL ML/MIN/{1.73_M2}
GFR SERPL CREATININE-BSD FRML MDRD: ABNORMAL ML/MIN/{1.73_M2}
GLUCOSE BLD-MCNC: 116 MG/DL (ref 70–99)
HBA1C MFR BLD: 5.8 % (ref 4.8–5.9)
HDLC SERPL-MCNC: 32 MG/DL
LDL CHOLESTEROL: 43 MG/DL (ref 0–130)
POTASSIUM SERPL-SCNC: 3.9 MMOL/L (ref 3.7–5.3)
SODIUM BLD-SCNC: 139 MMOL/L (ref 135–144)
TOTAL PROTEIN: 7 G/DL (ref 6.4–8.3)
TRIGL SERPL-MCNC: 294 MG/DL
VLDLC SERPL CALC-MCNC: ABNORMAL MG/DL (ref 1–30)

## 2018-08-31 PROCEDURE — 80053 COMPREHEN METABOLIC PANEL: CPT

## 2018-08-31 PROCEDURE — 83036 HEMOGLOBIN GLYCOSYLATED A1C: CPT

## 2018-08-31 PROCEDURE — 80061 LIPID PANEL: CPT

## 2018-08-31 PROCEDURE — 36415 COLL VENOUS BLD VENIPUNCTURE: CPT

## 2018-09-05 ENCOUNTER — OFFICE VISIT (OUTPATIENT)
Dept: FAMILY MEDICINE CLINIC | Age: 53
End: 2018-09-05
Payer: COMMERCIAL

## 2018-09-05 VITALS
OXYGEN SATURATION: 98 % | DIASTOLIC BLOOD PRESSURE: 84 MMHG | HEART RATE: 92 BPM | WEIGHT: 227 LBS | SYSTOLIC BLOOD PRESSURE: 124 MMHG | HEIGHT: 66 IN | BODY MASS INDEX: 36.48 KG/M2 | RESPIRATION RATE: 20 BRPM

## 2018-09-05 DIAGNOSIS — Z23 NEED FOR TETANUS, DIPHTHERIA, AND ACELLULAR PERTUSSIS (TDAP) VACCINE: ICD-10-CM

## 2018-09-05 DIAGNOSIS — Z23 NEED FOR SHINGLES VACCINE: ICD-10-CM

## 2018-09-05 DIAGNOSIS — E78.2 MIXED HYPERLIPIDEMIA: ICD-10-CM

## 2018-09-05 DIAGNOSIS — R73.01 ELEVATED FASTING GLUCOSE: ICD-10-CM

## 2018-09-05 DIAGNOSIS — Z11.4 SCREENING FOR HUMAN IMMUNODEFICIENCY VIRUS WITHOUT PRESENCE OF RISK FACTORS: ICD-10-CM

## 2018-09-05 DIAGNOSIS — R68.82 DECREASED LIBIDO: Primary | ICD-10-CM

## 2018-09-05 DIAGNOSIS — J45.20 MILD INTERMITTENT REACTIVE AIRWAY DISEASE WITHOUT COMPLICATION: ICD-10-CM

## 2018-09-05 PROCEDURE — 90471 IMMUNIZATION ADMIN: CPT | Performed by: FAMILY MEDICINE

## 2018-09-05 PROCEDURE — 90715 TDAP VACCINE 7 YRS/> IM: CPT | Performed by: FAMILY MEDICINE

## 2018-09-05 PROCEDURE — 99214 OFFICE O/P EST MOD 30 MIN: CPT | Performed by: FAMILY MEDICINE

## 2018-09-05 RX ORDER — ESTERIFIED ESTROGEN AND METHYLTESTOSTERONE .625; 1.25 MG/1; MG/1
1 TABLET ORAL DAILY
Qty: 90 TABLET | Refills: 1 | Status: SHIPPED | OUTPATIENT
Start: 2018-09-05 | End: 2020-01-15

## 2018-09-05 RX ORDER — FLUTICASONE PROPIONATE 110 UG/1
2 AEROSOL, METERED RESPIRATORY (INHALATION) 2 TIMES DAILY
Qty: 1 INHALER | Refills: 3 | Status: SHIPPED | OUTPATIENT
Start: 2018-09-05 | End: 2018-09-13 | Stop reason: CLARIF

## 2018-09-05 ASSESSMENT — PATIENT HEALTH QUESTIONNAIRE - PHQ9
SUM OF ALL RESPONSES TO PHQ QUESTIONS 1-9: 0
1. LITTLE INTEREST OR PLEASURE IN DOING THINGS: 0
2. FEELING DOWN, DEPRESSED OR HOPELESS: 0
SUM OF ALL RESPONSES TO PHQ9 QUESTIONS 1 & 2: 0
SUM OF ALL RESPONSES TO PHQ QUESTIONS 1-9: 0

## 2018-09-05 NOTE — PROGRESS NOTES
mouth daily 180 capsule 1    zolpidem (AMBIEN) 5 MG tablet Take 1 tablet by mouth nightly as needed for Sleep for up to 90 days. . 90 tablet 0    atorvastatin (LIPITOR) 80 MG tablet Take 1 tablet by mouth daily 90 tablet 0    estrogens, conjugated,-methyltestosterone (ESTRATEST HS) 0.625-1.25 MG per tablet Take 1 tablet by mouth daily. 90 tablet 1    UNABLE TO FIND Massage therapy once a week as needed. Diagnosis low back pain. 1 each 0    montelukast (SINGULAIR) 10 MG tablet Take 1 tablet by mouth daily 90 tablet 3    Ascorbic Acid (VITAMIN C) 250 MG CHEW Take 1 tablet by mouth daily      ibuprofen (ADVIL;MOTRIN) 200 MG tablet Take 200 mg by mouth every 6 hours as needed for Pain      mometasone-formoterol (DULERA) 200-5 MCG/ACT inhaler Inhale 2 puffs into the lungs 2 times daily 3 Inhaler 3    albuterol (PROVENTIL) (2.5 MG/3ML) 0.083% nebulizer solution Take 3 mLs by nebulization every 4 hours as needed for Wheezing or Shortness of Breath 120 each 0    albuterol (PROAIR HFA) 108 (90 BASE) MCG/ACT inhaler Inhale 2 puffs into the lungs every 4 hours as needed for Wheezing 3 Inhaler 1    traMADol (ULTRAM) 50 MG tablet TAKE 1 TO 2 TABLETS BY MOUTH EVERY 4 HOURS IF NEEDED 60 tablet 1       She is allergic to compazine [prochlorperazine] and tape [adhesive tape]. She  reports that she has never smoked. She has never used smokeless tobacco.      Objective:    Vitals:    09/05/18 1543   BP: 124/84   Site: Right Arm   Position: Sitting   Cuff Size: Large Adult   Pulse: 92   Resp: 20   SpO2: 98%   Weight: 227 lb (103 kg)   Height: 5' 6\" (1.676 m)     Body mass index is 36.64 kg/m². Obese  female, healthy-appearing, alert, and cooperative. Neck is supple, with no lymphadenopathy. Chest is clear to auscultation, no wheezes, rales, or rhonchi. Respirations are not labored. Heart sounds are regular rate and rhythm, no murmurs. Lower extremities have no edema.     Labs done 8/31/2018 were

## 2018-09-09 ENCOUNTER — TELEPHONE (OUTPATIENT)
Dept: PRIMARY CARE CLINIC | Age: 53
End: 2018-09-09

## 2018-09-09 NOTE — TELEPHONE ENCOUNTER
Please call the patient. When she was seen on 9/5/2018, Flovent was added. However, I overlooked that she was already taking Dulera 200-5 2 puffs BID. She should discontinue Flovent and increase her dose of Dulera to 3 puffs BID.

## 2018-10-10 DIAGNOSIS — J45.30 REACTIVE AIRWAY DISEASE, MILD PERSISTENT, UNCOMPLICATED: ICD-10-CM

## 2018-10-10 DIAGNOSIS — E78.2 MIXED HYPERLIPIDEMIA: ICD-10-CM

## 2018-10-10 DIAGNOSIS — F51.01 PRIMARY INSOMNIA: ICD-10-CM

## 2018-10-10 RX ORDER — MONTELUKAST SODIUM 10 MG/1
10 TABLET ORAL DAILY
Qty: 90 TABLET | Refills: 3 | Status: SHIPPED | OUTPATIENT
Start: 2018-10-10 | End: 2019-01-28 | Stop reason: SDUPTHER

## 2018-10-10 RX ORDER — ATORVASTATIN CALCIUM 80 MG/1
80 TABLET, FILM COATED ORAL DAILY
Qty: 90 TABLET | Refills: 0 | Status: SHIPPED | OUTPATIENT
Start: 2018-10-10 | End: 2019-01-09 | Stop reason: SDUPTHER

## 2018-10-10 RX ORDER — ZOLPIDEM TARTRATE 5 MG/1
5 TABLET ORAL NIGHTLY PRN
Qty: 90 TABLET | Refills: 0 | Status: SHIPPED | OUTPATIENT
Start: 2018-10-10 | End: 2019-02-14 | Stop reason: SDUPTHER

## 2018-10-16 DIAGNOSIS — E78.2 MIXED HYPERLIPIDEMIA: ICD-10-CM

## 2018-10-16 RX ORDER — ATORVASTATIN CALCIUM 80 MG/1
TABLET, FILM COATED ORAL
Qty: 90 TABLET | Refills: 5 | OUTPATIENT
Start: 2018-10-16

## 2018-10-22 ENCOUNTER — TELEPHONE (OUTPATIENT)
Dept: FAMILY MEDICINE CLINIC | Age: 53
End: 2018-10-22

## 2018-11-28 ENCOUNTER — HOSPITAL ENCOUNTER (OUTPATIENT)
Age: 53
Discharge: HOME | End: 2018-11-28
Payer: COMMERCIAL

## 2018-11-28 DIAGNOSIS — N30.00 ACUTE CYSTITIS WITHOUT HEMATURIA: ICD-10-CM

## 2018-11-28 DIAGNOSIS — N30.00 ACUTE CYSTITIS WITHOUT HEMATURIA: Primary | ICD-10-CM

## 2018-11-28 LAB
-: ABNORMAL
AMORPHOUS: ABNORMAL
BACTERIA: ABNORMAL
BILIRUBIN URINE: NEGATIVE
CASTS UA: ABNORMAL /LPF (ref 0–2)
COLOR: ABNORMAL
COMMENT UA: ABNORMAL
CRYSTALS, UA: ABNORMAL /HPF
EPITHELIAL CELLS UA: ABNORMAL /HPF (ref 0–5)
GLUCOSE URINE: NEGATIVE
KETONES, URINE: NEGATIVE
LEUKOCYTE ESTERASE, URINE: NEGATIVE
MUCUS: ABNORMAL
NITRITE, URINE: NEGATIVE
OTHER OBSERVATIONS UA: ABNORMAL
PH UA: 6.5 (ref 5–6)
PROTEIN UA: NEGATIVE
RBC UA: ABNORMAL /HPF (ref 0–4)
RENAL EPITHELIAL, UA: ABNORMAL /HPF
SPECIFIC GRAVITY UA: 1.02 (ref 1.01–1.02)
TRICHOMONAS: ABNORMAL
TURBIDITY: ABNORMAL
URINE HGB: ABNORMAL
UROBILINOGEN, URINE: NORMAL
WBC UA: ABNORMAL /HPF (ref 0–4)
YEAST: ABNORMAL

## 2018-11-28 PROCEDURE — 81001 URINALYSIS AUTO W/SCOPE: CPT

## 2018-11-28 RX ORDER — NITROFURANTOIN 25; 75 MG/1; MG/1
100 CAPSULE ORAL 2 TIMES DAILY
Qty: 10 CAPSULE | Refills: 0 | Status: SHIPPED | OUTPATIENT
Start: 2018-11-28 | End: 2018-12-03

## 2018-11-29 ENCOUNTER — OFFICE VISIT (OUTPATIENT)
Dept: OPTOMETRY | Age: 53
End: 2018-11-29
Payer: COMMERCIAL

## 2018-11-29 DIAGNOSIS — H52.4 MYOPIA OF BOTH EYES WITH ASTIGMATISM AND PRESBYOPIA: Primary | ICD-10-CM

## 2018-11-29 DIAGNOSIS — H52.203 MYOPIA OF BOTH EYES WITH ASTIGMATISM AND PRESBYOPIA: Primary | ICD-10-CM

## 2018-11-29 DIAGNOSIS — H52.13 MYOPIA OF BOTH EYES WITH ASTIGMATISM AND PRESBYOPIA: Primary | ICD-10-CM

## 2018-11-29 PROCEDURE — 92004 COMPRE OPH EXAM NEW PT 1/>: CPT | Performed by: OPTOMETRIST

## 2018-11-29 ASSESSMENT — REFRACTION_MANIFEST
OS_AXIS: 022
OD_AXIS: 157
OS_SPHERE: -4.25
OS_CYLINDER: -0.50
OD_ADD: +2.25
OD_CYLINDER: -1.00
OS_ADD: +2.25
OD_SPHERE: -4.50

## 2018-11-29 ASSESSMENT — REFRACTION_WEARINGRX
OD_AXIS: 160
OD_SPHERE: -4.25
OS_AXIS: 022
SPECS_TYPE: PAL
OS_ADD: +1.75
OS_CYLINDER: -1.00
OD_ADD: +1.75
OD_CYLINDER: -1.00
OS_SPHERE: -4.00

## 2018-11-29 ASSESSMENT — SLIT LAMP EXAM - LIDS
COMMENTS: NORMAL
COMMENTS: NORMAL

## 2018-11-29 ASSESSMENT — TONOMETRY
IOP_METHOD: NON-CONTACT AIR PUFF
OS_IOP_MMHG: 20
OD_IOP_MMHG: 19

## 2018-11-29 ASSESSMENT — VISUAL ACUITY
CORRECTION_TYPE: GLASSES
OD_CC: 20/30 OU
OS_CC: 20/30
METHOD: SNELLEN - LINEAR

## 2019-01-09 ENCOUNTER — OFFICE VISIT (OUTPATIENT)
Dept: FAMILY MEDICINE CLINIC | Age: 54
End: 2019-01-09
Payer: MEDICARE

## 2019-01-09 VITALS
HEIGHT: 66 IN | HEART RATE: 88 BPM | SYSTOLIC BLOOD PRESSURE: 124 MMHG | BODY MASS INDEX: 36.45 KG/M2 | WEIGHT: 226.8 LBS | RESPIRATION RATE: 16 BRPM | DIASTOLIC BLOOD PRESSURE: 86 MMHG

## 2019-01-09 DIAGNOSIS — R21 RASH: ICD-10-CM

## 2019-01-09 DIAGNOSIS — R68.82 DECREASED LIBIDO: ICD-10-CM

## 2019-01-09 DIAGNOSIS — F33.40 RECURRENT MAJOR DEPRESSIVE DISORDER, IN REMISSION (HCC): ICD-10-CM

## 2019-01-09 DIAGNOSIS — R53.83 FATIGUE, UNSPECIFIED TYPE: ICD-10-CM

## 2019-01-09 DIAGNOSIS — R73.01 ELEVATED FASTING GLUCOSE: ICD-10-CM

## 2019-01-09 DIAGNOSIS — F51.01 PRIMARY INSOMNIA: Primary | ICD-10-CM

## 2019-01-09 DIAGNOSIS — E78.2 MIXED HYPERLIPIDEMIA: ICD-10-CM

## 2019-01-09 DIAGNOSIS — G89.12 POST-THORACOTOMY PAIN SYNDROME: ICD-10-CM

## 2019-01-09 DIAGNOSIS — K55.9 ISCHEMIC COLITIS (HCC): ICD-10-CM

## 2019-01-09 DIAGNOSIS — R19.7 DIARRHEA, UNSPECIFIED TYPE: ICD-10-CM

## 2019-01-09 PROCEDURE — 99214 OFFICE O/P EST MOD 30 MIN: CPT | Performed by: FAMILY MEDICINE

## 2019-01-09 RX ORDER — TRAMADOL HYDROCHLORIDE 50 MG/1
50 TABLET ORAL EVERY 4 HOURS PRN
Qty: 60 TABLET | Refills: 1 | Status: CANCELLED | OUTPATIENT
Start: 2019-01-09

## 2019-01-09 RX ORDER — ESTERIFIED ESTROGEN AND METHYLTESTOSTERONE .625; 1.25 MG/1; MG/1
1 TABLET ORAL DAILY
Qty: 90 TABLET | Refills: 1 | Status: CANCELLED | OUTPATIENT
Start: 2019-01-09

## 2019-01-09 RX ORDER — ZOLPIDEM TARTRATE 5 MG/1
5 TABLET ORAL NIGHTLY PRN
Qty: 30 TABLET | Refills: 0 | Status: SHIPPED | OUTPATIENT
Start: 2019-01-09 | End: 2019-02-08

## 2019-01-09 RX ORDER — VENLAFAXINE HYDROCHLORIDE 75 MG/1
150 CAPSULE, EXTENDED RELEASE ORAL DAILY
Qty: 180 CAPSULE | Refills: 1 | Status: SHIPPED | OUTPATIENT
Start: 2019-01-09 | End: 2019-05-15 | Stop reason: SDUPTHER

## 2019-01-09 RX ORDER — TRAMADOL HYDROCHLORIDE 50 MG/1
50 TABLET ORAL EVERY 6 HOURS PRN
Qty: 60 TABLET | Refills: 0 | Status: SHIPPED | OUTPATIENT
Start: 2019-01-09 | End: 2019-02-08

## 2019-01-09 RX ORDER — ATORVASTATIN CALCIUM 80 MG/1
80 TABLET, FILM COATED ORAL DAILY
Qty: 90 TABLET | Refills: 1 | Status: SHIPPED | OUTPATIENT
Start: 2019-01-09 | End: 2019-05-15 | Stop reason: SDUPTHER

## 2019-01-09 RX ORDER — ZOLPIDEM TARTRATE 5 MG/1
5 TABLET ORAL NIGHTLY PRN
COMMUNITY
End: 2019-01-09 | Stop reason: SDUPTHER

## 2019-01-09 RX ORDER — TRIAMCINOLONE ACETONIDE 5 MG/G
CREAM TOPICAL
Qty: 1 TUBE | Refills: 0 | Status: SHIPPED | OUTPATIENT
Start: 2019-01-09 | End: 2020-01-15

## 2019-01-09 ASSESSMENT — PATIENT HEALTH QUESTIONNAIRE - PHQ9
SUM OF ALL RESPONSES TO PHQ QUESTIONS 1-9: 0
1. LITTLE INTEREST OR PLEASURE IN DOING THINGS: 0
SUM OF ALL RESPONSES TO PHQ QUESTIONS 1-9: 0
SUM OF ALL RESPONSES TO PHQ9 QUESTIONS 1 & 2: 0
2. FEELING DOWN, DEPRESSED OR HOPELESS: 0

## 2019-01-15 LAB
AVERAGE GLUCOSE: NORMAL
CHOLESTEROL, TOTAL: 114 MG/DL
CHOLESTEROL/HDL RATIO: 2.9
CREATININE: 0.7 MG/DL
HBA1C MFR BLD: 5.9 %
HDLC SERPL-MCNC: 39 MG/DL (ref 35–70)
LDL CHOLESTEROL CALCULATED: 36.8 MG/DL (ref 0–160)
POTASSIUM (K+): 3.7
TRIGL SERPL-MCNC: 191 MG/DL
TSH SERPL DL<=0.05 MIU/L-ACNC: 4.44 UIU/ML
TSH SERPL DL<=0.05 MIU/L-ACNC: 4.44 UIU/ML
VLDLC SERPL CALC-MCNC: 38 MG/DL

## 2019-01-16 ENCOUNTER — TELEPHONE (OUTPATIENT)
Dept: FAMILY MEDICINE CLINIC | Age: 54
End: 2019-01-16

## 2019-01-16 DIAGNOSIS — R79.0 DECREASED BLOOD CALCIUM: Primary | ICD-10-CM

## 2019-01-16 DIAGNOSIS — R53.83 FATIGUE, UNSPECIFIED TYPE: ICD-10-CM

## 2019-01-28 ENCOUNTER — TELEPHONE (OUTPATIENT)
Dept: PULMONOLOGY | Age: 54
End: 2019-01-28

## 2019-01-28 DIAGNOSIS — J45.20 MILD INTERMITTENT REACTIVE AIRWAY DISEASE WITHOUT COMPLICATION: Primary | ICD-10-CM

## 2019-01-28 RX ORDER — MONTELUKAST SODIUM 10 MG/1
10 TABLET ORAL NIGHTLY
Qty: 30 TABLET | Refills: 11 | Status: SHIPPED | OUTPATIENT
Start: 2019-01-28 | End: 2020-01-01

## 2019-02-14 DIAGNOSIS — F51.01 PRIMARY INSOMNIA: ICD-10-CM

## 2019-02-14 RX ORDER — ZOLPIDEM TARTRATE 5 MG/1
5 TABLET ORAL NIGHTLY PRN
Qty: 90 TABLET | Refills: 0 | Status: SHIPPED | OUTPATIENT
Start: 2019-02-14 | End: 2019-05-15 | Stop reason: SDUPTHER

## 2019-02-18 ENCOUNTER — TELEPHONE (OUTPATIENT)
Dept: PRIMARY CARE CLINIC | Age: 54
End: 2019-02-18

## 2019-02-27 ENCOUNTER — TELEPHONE (OUTPATIENT)
Dept: PRIMARY CARE CLINIC | Age: 54
End: 2019-02-27

## 2019-03-05 PROBLEM — I87.1 VEIN COMPRESSION: Status: ACTIVE | Noted: 2019-03-05

## 2019-03-07 ENCOUNTER — HOSPITAL ENCOUNTER (OUTPATIENT)
Dept: LAB | Age: 54
Discharge: HOME | End: 2019-03-07
Payer: MEDICARE

## 2019-03-07 DIAGNOSIS — R53.83 FATIGUE, UNSPECIFIED TYPE: ICD-10-CM

## 2019-03-07 DIAGNOSIS — R79.0 DECREASED BLOOD CALCIUM: ICD-10-CM

## 2019-03-07 LAB
ABSOLUTE EOS #: 0.3 K/UL (ref 0–0.4)
ABSOLUTE IMMATURE GRANULOCYTE: NORMAL K/UL (ref 0–0.3)
ABSOLUTE LYMPH #: 2.8 K/UL (ref 1–4.8)
ABSOLUTE MONO #: 0.6 K/UL (ref 0.1–1.2)
ANION GAP SERPL CALCULATED.3IONS-SCNC: 13 MMOL/L (ref 9–17)
BASOPHILS # BLD: 1 % (ref 0–1)
BASOPHILS ABSOLUTE: 0 K/UL (ref 0–0.2)
BUN BLDV-MCNC: 14 MG/DL (ref 6–20)
BUN/CREAT BLD: 17 (ref 9–20)
CALCIUM SERPL-MCNC: 9 MG/DL (ref 8.6–10.4)
CHLORIDE BLD-SCNC: 100 MMOL/L (ref 98–107)
CO2: 31 MMOL/L (ref 20–31)
CREAT SERPL-MCNC: 0.83 MG/DL (ref 0.5–0.9)
DIFFERENTIAL TYPE: NORMAL
EOSINOPHILS RELATIVE PERCENT: 4 % (ref 1–7)
GFR AFRICAN AMERICAN: >60 ML/MIN
GFR NON-AFRICAN AMERICAN: >60 ML/MIN
GFR SERPL CREATININE-BSD FRML MDRD: ABNORMAL ML/MIN/{1.73_M2}
GFR SERPL CREATININE-BSD FRML MDRD: ABNORMAL ML/MIN/{1.73_M2}
GLUCOSE BLD-MCNC: 103 MG/DL (ref 70–99)
HCT VFR BLD CALC: 38.9 % (ref 36–46)
HEMOGLOBIN: 13.4 G/DL (ref 12–16)
IMMATURE GRANULOCYTES: NORMAL %
LYMPHOCYTES # BLD: 33 % (ref 16–46)
MCH RBC QN AUTO: 31.2 PG (ref 26–34)
MCHC RBC AUTO-ENTMCNC: 34.5 G/DL (ref 31–37)
MCV RBC AUTO: 90.4 FL (ref 80–100)
MONOCYTES # BLD: 8 % (ref 4–11)
NRBC AUTOMATED: NORMAL PER 100 WBC
PDW BLD-RTO: 13.4 % (ref 11–14.5)
PLATELET # BLD: 174 K/UL (ref 140–450)
PLATELET ESTIMATE: NORMAL
PMV BLD AUTO: 7.9 FL (ref 6–12)
POTASSIUM SERPL-SCNC: 4 MMOL/L (ref 3.7–5.3)
RBC # BLD: 4.3 M/UL (ref 4–5.2)
RBC # BLD: NORMAL 10*6/UL
SEG NEUTROPHILS: 54 % (ref 43–77)
SEGMENTED NEUTROPHILS ABSOLUTE COUNT: 4.5 K/UL (ref 1.8–7.7)
SODIUM BLD-SCNC: 144 MMOL/L (ref 135–144)
THYROXINE, FREE: 0.81 NG/DL (ref 0.93–1.7)
TSH SERPL DL<=0.05 MIU/L-ACNC: 3.48 MIU/L (ref 0.3–5)
WBC # BLD: 8.2 K/UL (ref 3.5–11)
WBC # BLD: NORMAL 10*3/UL

## 2019-03-07 PROCEDURE — 85025 COMPLETE CBC W/AUTO DIFF WBC: CPT

## 2019-03-07 PROCEDURE — 84443 ASSAY THYROID STIM HORMONE: CPT

## 2019-03-07 PROCEDURE — 36415 COLL VENOUS BLD VENIPUNCTURE: CPT

## 2019-03-07 PROCEDURE — 84439 ASSAY OF FREE THYROXINE: CPT

## 2019-03-07 PROCEDURE — 80048 BASIC METABOLIC PNL TOTAL CA: CPT

## 2019-04-02 ENCOUNTER — TELEPHONE (OUTPATIENT)
Dept: PRIMARY CARE CLINIC | Age: 54
End: 2019-04-02

## 2019-04-26 ENCOUNTER — OFFICE VISIT (OUTPATIENT)
Dept: PRIMARY CARE CLINIC | Age: 54
End: 2019-04-26
Payer: MEDICARE

## 2019-04-26 VITALS
BODY MASS INDEX: 36.29 KG/M2 | OXYGEN SATURATION: 92 % | DIASTOLIC BLOOD PRESSURE: 90 MMHG | WEIGHT: 225.8 LBS | HEIGHT: 66 IN | HEART RATE: 102 BPM | SYSTOLIC BLOOD PRESSURE: 130 MMHG | TEMPERATURE: 98 F

## 2019-04-26 DIAGNOSIS — R05.9 COUGH: ICD-10-CM

## 2019-04-26 DIAGNOSIS — J45.21 MILD INTERMITTENT ASTHMA WITH EXACERBATION: ICD-10-CM

## 2019-04-26 DIAGNOSIS — J06.9 VIRAL UPPER RESPIRATORY TRACT INFECTION: Primary | ICD-10-CM

## 2019-04-26 PROCEDURE — 99213 OFFICE O/P EST LOW 20 MIN: CPT | Performed by: NURSE PRACTITIONER

## 2019-04-26 RX ORDER — ALBUTEROL SULFATE 90 UG/1
2 AEROSOL, METERED RESPIRATORY (INHALATION) EVERY 4 HOURS PRN
Qty: 1 INHALER | Refills: 0 | Status: ON HOLD | OUTPATIENT
Start: 2019-04-26 | End: 2020-01-01

## 2019-04-26 RX ORDER — PREDNISONE 20 MG/1
20 TABLET ORAL 2 TIMES DAILY
Qty: 10 TABLET | Refills: 0 | Status: SHIPPED | OUTPATIENT
Start: 2019-04-26 | End: 2019-05-01

## 2019-04-26 RX ORDER — BENZONATATE 200 MG/1
200 CAPSULE ORAL 3 TIMES DAILY PRN
Qty: 30 CAPSULE | Refills: 0 | Status: SHIPPED | OUTPATIENT
Start: 2019-04-26 | End: 2019-05-03

## 2019-04-26 ASSESSMENT — PATIENT HEALTH QUESTIONNAIRE - PHQ9
SUM OF ALL RESPONSES TO PHQ9 QUESTIONS 1 & 2: 0
1. LITTLE INTEREST OR PLEASURE IN DOING THINGS: 0
SUM OF ALL RESPONSES TO PHQ QUESTIONS 1-9: 0
2. FEELING DOWN, DEPRESSED OR HOPELESS: 0
SUM OF ALL RESPONSES TO PHQ QUESTIONS 1-9: 0

## 2019-04-26 ASSESSMENT — ENCOUNTER SYMPTOMS
COUGH: 1
VOMITING: 0
RHINORRHEA: 1
ABDOMINAL PAIN: 0
CHEST TIGHTNESS: 0
WHEEZING: 1
SINUS PRESSURE: 0
SHORTNESS OF BREATH: 0
SORE THROAT: 1
DIARRHEA: 0
NAUSEA: 0

## 2019-04-26 NOTE — PROGRESS NOTES
[facility]             [address], [address]                        Telephone [phone]             Fax [phone]     Valorie Anderson  1965  JNJ:[id number]   Date of visit:  4/26/2019    Subjective:     Valorie Anderson is a 49 y.o.  female who presents to [facility] today (4/26/2019) for evaluation of:    Chief Complaint   Patient presents with    URI     Patient states chest congestion and wheezing times three days. URI    This is a new problem. The current episode started in the past 7 days (X 3 days ago). The problem has been gradually worsening. There has been no fever. Associated symptoms include congestion, coughing, headaches, a rash (X 1 week redness and itching behind left arm), rhinorrhea, sneezing, a sore throat and wheezing. Pertinent negatives include no abdominal pain, chest pain, diarrhea, ear pain, nausea, plugged ear sensation or vomiting. She has tried acetaminophen (cough drops) for the symptoms. The treatment provided mild relief. She has the following problem list:  Patient Active Problem List   Diagnosis    Interstitial lung disease ([address]    Depression    Reactive airway disease    Tachycardia    Obesity (BMI 30-39. 9)    Post-thoracotomy pain syndrome    Hx of stem cell transplant ([address]    Mixed hyperlipidemia    History of Hodgkin's lymphoma    History of DVT (deep vein thrombosis)    Primary insomnia    Neuropathy due to chemotherapeutic drug ([address]    High risk medication use    Myopia of both eyes with astigmatism    Elevated fasting glucose    Rectal bleeding    Ischemic colitis ([address]    bilateral iliac vein compression        Current medications are:  Current Outpatient Medications   Medication Sig Dispense Refill    predniSONE (DELTASONE) 20 MG tablet Take 1 tablet by mouth 2 times daily for 5 days 10 tablet 0    albuterol sulfate HFA (PROVENTIL HFA) 108 (90 Base) MCG/ACT inhaler Inhale 2 puffs into the lungs every 4 hours as needed for Wheezing 1 Inhaler 0    benzonatate (TESSALON) 200 MG capsule Take 1 capsule by mouth 3 times daily as needed for Cough 30 capsule 0    zolpidem (AMBIEN) 5 MG tablet Take 1 tablet by mouth nightly as needed for Sleep for up to 90 days. . 90 tablet 0    montelukast (SINGULAIR) 10 MG tablet Take 1 tablet by mouth nightly 30 tablet 11    atorvastatin (LIPITOR) 80 MG tablet Take 1 tablet by mouth daily 90 tablet 1    venlafaxine (EFFEXOR XR) 75 MG extended release capsule Take 2 capsules by mouth daily 180 capsule 1    triamcinolone (ARISTOCORT) 0.5 % cream Apply topically 3 times daily. 1 Tube 0    mometasone-formoterol (DULERA) 200-5 MCG/ACT inhaler Inhale 2 puffs into the lungs every 12 hours 3 Inhaler 3    UNABLE TO FIND Massage therapy once a week as needed. Diagnosis low back pain. 1 each 0    Ascorbic Acid (VITAMIN C) 250 MG CHEW Take 1 tablet by mouth daily      ibuprofen (ADVIL;MOTRIN) 200 MG tablet Take 200 mg by mouth every 6 hours as needed for Pain      albuterol (PROVENTIL) (2.5 MG/3ML) 0.083% nebulizer solution Take 3 mLs by nebulization every 4 hours as needed for Wheezing or Shortness of Breath 120 each 0    albuterol (PROAIR HFA) 108 (90 BASE) MCG/ACT inhaler Inhale 2 puffs into the lungs every 4 hours as needed for Wheezing 3 Inhaler 1    estrogens, conjugated,-methyltestosterone (ESTRATEST HS) 0.625-1.25 MG per tablet Take 1 tablet by mouth daily. . 90 tablet 1     No current facility-administered medications for this visit. She is allergic to compazine [prochlorperazine] and tape [adhesive tape]. .    She  reports that she has never smoked. She has never used smokeless tobacco.      Objective:    Vitals:    04/26/19 1532   BP: (!) 130/90   Pulse:    Temp:    SpO2:      Body mass index is 37 kg/m².     Review of Systems   Constitutional: Positive for appetite change and fatigue. Negative for chills and fever. HENT: Positive for congestion, postnasal drip, rhinorrhea, sneezing and sore throat. Negative for ear pain and sinus pressure. Respiratory: Positive for cough and wheezing. Negative for chest tightness and shortness of breath. Cardiovascular: Negative for chest pain. Gastrointestinal: Negative for abdominal pain, diarrhea, nausea and vomiting. Skin: Positive for rash (X 1 week redness and itching behind left arm). Neurological: Positive for headaches. Physical Exam   Constitutional: She is oriented to person, place, and time. She appears well-developed and well-nourished. HENT:   Head: Normocephalic. Right Ear: Hearing, tympanic membrane, external ear and ear canal normal.   Left Ear: Hearing, tympanic membrane, external ear and ear canal normal.   Nose: Mucosal edema (erythema) and rhinorrhea present. Right sinus exhibits no maxillary sinus tenderness and no frontal sinus tenderness. Left sinus exhibits no maxillary sinus tenderness and no frontal sinus tenderness. Mouth/Throat: Uvula is midline and mucous membranes are normal. Posterior oropharyngeal erythema present. Eyes: Pupils are equal, round, and reactive to light. Neck: Normal range of motion. Neck supple. Cardiovascular: Normal rate, regular rhythm and normal heart sounds. Pulmonary/Chest: Effort normal. She has wheezes (expiratory bilateral throughout). Lymphadenopathy:        Head (right side): Tonsillar adenopathy present. Head (left side): Tonsillar adenopathy present. She has no cervical adenopathy. Neurological: She is alert and oriented to person, place, and time. Skin: Skin is warm and dry. Rash noted. Rash is papular. There is erythema. Psychiatric: She has a normal mood and affect. Her behavior is normal. Thought content normal.   Nursing note and vitals reviewed. Assessment and Plan:     Diagnosis Orders   1.  Viral upper respiratory tract infection     2. Mild intermittent asthma with exacerbation  predniSONE (DELTASONE) 20 MG tablet    albuterol sulfate HFA (PROVENTIL HFA) 108 (90 Base) MCG/ACT inhaler   3. Cough  benzonatate (TESSALON) 200 MG capsule     Complete full course of prednisone. Do not take NSAIDs while taking prednisone. I recommended that she use Coricidin HBP to help with congestion and cough. Use albuterol inhaler as directed. Take Tessalon as needed for cough. Warm salt water gargles and Chloraseptic spray as needed. I also recommended Flonase and an antihistamine for sinus symptoms. she was also encouraged to use Tylenol for pain/fever. Apply triamcinolone cream to rash as directed. Increase water intake. Use cool mist humidifier at bedtime. Use nasal saline flush as needed. Good hand hygiene. she was instructed to return if there is no improvement or symptoms worsen.        Electronically signed by JOSE Vila CNP on 4/26/19 at 3:43 PM

## 2019-04-26 NOTE — PATIENT INSTRUCTIONS
Patient Education        Learning About Asthma  What is asthma? Asthma is a long-term condition that affects your breathing. It causes the airways that lead to the lungs to swell. People with asthma may have asthma attacks. During an asthma attack, the airways tighten and become narrower. This makes it hard to breathe, and you may wheeze or cough. If you have a bad asthma attack, you may need emergency care. Asthma affects people in different ways. Some people only have asthma attacks during allergy season, or when they breathe in cold air, or when they exercise. Others have many bad attacks that send them to the doctor often. What are the symptoms? Symptoms of asthma can be mild or severe. You may have mild attacks now and then, you may have severe symptoms every day, or you may have something in between. How often you have symptoms can also change. When you have asthma, you may:  · Wheeze, making a loud or soft whistling noise when you breathe in and out. · Cough a lot. · Feel tightness in your chest.  · Feel short of breath. · Have trouble sleeping because of coughing or having a hard time breathing. · Get tired quickly during exercise. Your symptoms may be worse at night. How can you prevent asthma attacks? Certain things can make asthma symptoms worse. These are called triggers. When you are around a trigger, an asthma attack is more likely. Common triggers include:  · Cigarette smoke or air pollution. · Things you are allergic to, such as:  ? Pollen, mold, or dust mites. ? Pet hair, skin, or saliva. · Illnesses, like colds, flu, or pneumonia. · Exercise. · Dry, cold air. Here are some ways to avoid a few common triggers:  · Do not smoke or allow others to smoke around you. If you need help quitting, talk to your doctor about stop-smoking programs and medicines. These can increase your chances of quitting for good.   · If there is a lot of pollution, pollen, or dust outside, stay at home warranty or liability for your use of this information. Patient Education        Upper Respiratory Infection (Cold): Care Instructions  Your Care Instructions    An upper respiratory infection, or URI, is an infection of the nose, sinuses, or throat. URIs are spread by coughs, sneezes, and direct contact. The common cold is the most frequent kind of URI. The flu and sinus infections are other kinds of URIs. Almost all URIs are caused by viruses. Antibiotics won't cure them. But you can treat most infections with home care. This may include drinking lots of fluids and taking over-the-counter pain medicine. You will probably feel better in 4 to 10 days. The doctor has checked you carefully, but problems can develop later. If you notice any problems or new symptoms, get medical treatment right away. Follow-up care is a key part of your treatment and safety. Be sure to make and go to all appointments, and call your doctor if you are having problems. It's also a good idea to know your test results and keep a list of the medicines you take. How can you care for yourself at home? · To prevent dehydration, drink plenty of fluids, enough so that your urine is light yellow or clear like water. Choose water and other caffeine-free clear liquids until you feel better. If you have kidney, heart, or liver disease and have to limit fluids, talk with your doctor before you increase the amount of fluids you drink. · Take an over-the-counter pain medicine, such as acetaminophen (Tylenol), ibuprofen (Advil, Motrin), or naproxen (Aleve). Read and follow all instructions on the label. · Before you use cough and cold medicines, check the label. These medicines may not be safe for young children or for people with certain health problems. · Be careful when taking over-the-counter cold or flu medicines and Tylenol at the same time. Many of these medicines have acetaminophen, which is Tylenol.  Read the labels to make sure that you are not taking more than the recommended dose. Too much acetaminophen (Tylenol) can be harmful. · Get plenty of rest.  · Do not smoke or allow others to smoke around you. If you need help quitting, talk to your doctor about stop-smoking programs and medicines. These can increase your chances of quitting for good. When should you call for help? Call 911 anytime you think you may need emergency care. For example, call if:    · You have severe trouble breathing.    Call your doctor now or seek immediate medical care if:    · You seem to be getting much sicker.     · You have new or worse trouble breathing.     · You have a new or higher fever.     · You have a new rash.    Watch closely for changes in your health, and be sure to contact your doctor if:    · You have a new symptom, such as a sore throat, an earache, or sinus pain.     · You cough more deeply or more often, especially if you notice more mucus or a change in the color of your mucus.     · You do not get better as expected. Where can you learn more? Go to https://MI Airline.Fieldoo. org and sign in to your [employer] account. Enter L216 in the Nixle box to learn more about \"Upper Respiratory Infection (Cold): Care Instructions. \"     If you do not have an account, please click on the \"Sign Up Now\" link. Current as of: September 5, 2018  Content Version: 11.9  © 1072-1629 RentMatch, Incorporated. Care instructions adapted under license by [facility] ([facility]). If you have questions about a medical condition or this instruction, always ask your healthcare professional. [place] [address] any warranty or liability for your use of this information. Patient Education        Saline Nasal Washes: Care Instructions  Your Care Instructions  Saline nasal washes help keep the nasal passages open by washing out thick or dried mucus. This simple remedy can help relieve symptoms of allergies, sinusitis, and colds. It also can make the nose feel more comfortable by keeping the mucous membranes moist. You may notice a little burning sensation in your nose the first few times you use the solution, but this usually gets better in a few days. Follow-up care is a key part of your treatment and safety. Be sure to make and go to all appointments, and call your doctor if you are having problems. It's also a good idea to know your test results and keep a list of the medicines you take. How can you care for yourself at home? · You can buy premixed saline solution in a squeeze bottle or other sinus rinse products at a drugstore. Read and follow the instructions on the label. · You also can make your own saline solution by adding 1 teaspoon of salt and 1 teaspoon of baking soda to 2 cups of distilled water. · If you use a homemade solution, pour a small amount into a clean bowl. Using a rubber bulb syringe, squeeze the syringe and place the tip in the salt water. Pull a small amount of the salt water into the syringe by relaxing your hand. · Sit down with your head tilted slightly back. Do not lie down. Put the tip of the bulb syringe or the squeeze bottle a little way into one of your nostrils. Gently drip or squirt a few drops into the nostril. Repeat with the other nostril. Some sneezing and gagging are normal at first.  · Gently blow your nose. · Wipe the syringe or bottle tip clean after each use. · Repeat this 2 or 3 times a day. · Use nasal washes gently if you have nosebleeds often. When should you call for help? Watch closely for changes in your health, and be sure to contact your doctor if:    · You often get nosebleeds.     · You have problems doing the nasal washes. Where can you learn more? Go to https://chpeclaryeweb.Takeacoder. org and sign in to your Loginza account. Enter [id number] in the [facility] box to learn more about \"Saline Nasal Washes: Care Instructions. \"     If you do not have an account, please click on the \"Sign Up Now\" link. Current as of: March 27, 2018  Content Version: 11.9  © 2088-5984 [employer]ica[employer], Incorporated. Care instructions adapted under license by [facility] ([facility]). If you have questions about a medical condition or this instruction, always ask your healthcare professional. [address] any warranty or liability for your use of this information.

## 2019-05-07 ENCOUNTER — OFFICE VISIT (OUTPATIENT)
Dept: PRIMARY CARE CLINIC | Age: 54
End: 2019-05-07
Payer: MEDICARE

## 2019-05-07 ENCOUNTER — APPOINTMENT (OUTPATIENT)
Dept: INTERVENTIONAL RADIOLOGY/VASCULAR | Age: 54
End: 2019-05-07
Payer: MEDICARE

## 2019-05-07 ENCOUNTER — HOSPITAL ENCOUNTER (EMERGENCY)
Age: 54
Discharge: HOME | End: 2019-05-07
Attending: EMERGENCY MEDICINE
Payer: MEDICARE

## 2019-05-07 VITALS
RESPIRATION RATE: 16 BRPM | OXYGEN SATURATION: 96 % | BODY MASS INDEX: 36.54 KG/M2 | SYSTOLIC BLOOD PRESSURE: 112 MMHG | WEIGHT: 223 LBS | DIASTOLIC BLOOD PRESSURE: 86 MMHG | TEMPERATURE: 97.9 F | HEART RATE: 102 BPM

## 2019-05-07 VITALS
WEIGHT: 224 LBS | BODY MASS INDEX: 36.71 KG/M2 | OXYGEN SATURATION: 98 % | DIASTOLIC BLOOD PRESSURE: 84 MMHG | SYSTOLIC BLOOD PRESSURE: 138 MMHG | TEMPERATURE: 98 F | HEART RATE: 74 BPM

## 2019-05-07 DIAGNOSIS — M79.605 LEFT LEG PAIN: Primary | ICD-10-CM

## 2019-05-07 DIAGNOSIS — M79.652 ACUTE PAIN OF LEFT THIGH: Primary | ICD-10-CM

## 2019-05-07 LAB
ABSOLUTE EOS #: 0.4 K/UL (ref 0–0.4)
ABSOLUTE IMMATURE GRANULOCYTE: NORMAL K/UL (ref 0–0.3)
ABSOLUTE LYMPH #: 2.9 K/UL (ref 1–4.8)
ABSOLUTE MONO #: 0.6 K/UL (ref 0.1–1.2)
ANION GAP SERPL CALCULATED.3IONS-SCNC: 10 MMOL/L (ref 9–17)
BASOPHILS # BLD: 1 % (ref 0–1)
BASOPHILS ABSOLUTE: 0.1 K/UL (ref 0–0.2)
BUN BLDV-MCNC: 12 MG/DL (ref 6–20)
BUN/CREAT BLD: 16 (ref 9–20)
CALCIUM SERPL-MCNC: 8.5 MG/DL (ref 8.6–10.4)
CHLORIDE BLD-SCNC: 99 MMOL/L (ref 98–107)
CO2: 30 MMOL/L (ref 20–31)
CREAT SERPL-MCNC: 0.74 MG/DL (ref 0.5–0.9)
DIFFERENTIAL TYPE: NORMAL
EOSINOPHILS RELATIVE PERCENT: 5 % (ref 1–7)
GFR AFRICAN AMERICAN: >60 ML/MIN
GFR NON-AFRICAN AMERICAN: >60 ML/MIN
GFR SERPL CREATININE-BSD FRML MDRD: ABNORMAL ML/MIN/{1.73_M2}
GFR SERPL CREATININE-BSD FRML MDRD: ABNORMAL ML/MIN/{1.73_M2}
GLUCOSE BLD-MCNC: 154 MG/DL (ref 70–99)
HCT VFR BLD CALC: 39.8 % (ref 36–46)
HEMOGLOBIN: 13.2 G/DL (ref 12–16)
IMMATURE GRANULOCYTES: NORMAL %
INR BLD: 1.1
LYMPHOCYTES # BLD: 32 % (ref 16–46)
MCH RBC QN AUTO: 31.1 PG (ref 26–34)
MCHC RBC AUTO-ENTMCNC: 33.2 G/DL (ref 31–37)
MCV RBC AUTO: 93.4 FL (ref 80–100)
MONOCYTES # BLD: 7 % (ref 4–11)
NRBC AUTOMATED: NORMAL PER 100 WBC
PARTIAL THROMBOPLASTIN TIME: 29.8 SEC (ref 27–35)
PDW BLD-RTO: 13.5 % (ref 11–14.5)
PLATELET # BLD: 196 K/UL (ref 140–450)
PLATELET ESTIMATE: NORMAL
PMV BLD AUTO: 8.3 FL (ref 6–12)
POTASSIUM SERPL-SCNC: 4.2 MMOL/L (ref 3.7–5.3)
PROTHROMBIN TIME: 11.2 SEC (ref 9.4–11.3)
RBC # BLD: 4.26 M/UL (ref 4–5.2)
RBC # BLD: NORMAL 10*6/UL
SEG NEUTROPHILS: 55 % (ref 43–77)
SEGMENTED NEUTROPHILS ABSOLUTE COUNT: 5 K/UL (ref 1.8–7.7)
SODIUM BLD-SCNC: 139 MMOL/L (ref 135–144)
WBC # BLD: 9 K/UL (ref 3.5–11)
WBC # BLD: NORMAL 10*3/UL

## 2019-05-07 PROCEDURE — 85025 COMPLETE CBC W/AUTO DIFF WBC: CPT

## 2019-05-07 PROCEDURE — 85610 PROTHROMBIN TIME: CPT

## 2019-05-07 PROCEDURE — 36415 COLL VENOUS BLD VENIPUNCTURE: CPT

## 2019-05-07 PROCEDURE — 99214 OFFICE O/P EST MOD 30 MIN: CPT | Performed by: FAMILY MEDICINE

## 2019-05-07 PROCEDURE — 93971 EXTREMITY STUDY: CPT

## 2019-05-07 PROCEDURE — 85730 THROMBOPLASTIN TIME PARTIAL: CPT

## 2019-05-07 PROCEDURE — 80048 BASIC METABOLIC PNL TOTAL CA: CPT

## 2019-05-07 PROCEDURE — 99283 EMERGENCY DEPT VISIT LOW MDM: CPT

## 2019-05-07 ASSESSMENT — ENCOUNTER SYMPTOMS
CHEST TIGHTNESS: 0
SHORTNESS OF BREATH: 0
WHEEZING: 0
COUGH: 0
COLOR CHANGE: 0

## 2019-05-07 ASSESSMENT — PAIN DESCRIPTION - PAIN TYPE: TYPE: ACUTE PAIN

## 2019-05-07 ASSESSMENT — PAIN SCALES - GENERAL: PAINLEVEL_OUTOF10: 7

## 2019-05-07 ASSESSMENT — PAIN DESCRIPTION - ORIENTATION: ORIENTATION: LEFT;UPPER

## 2019-05-07 ASSESSMENT — PAIN DESCRIPTION - LOCATION: LOCATION: LEG

## 2019-05-08 NOTE — ED TRIAGE NOTES
Patient presents to ED from urgent care c/o left leg pain that has been on-going for the last couple of days. Patient states that she has had a DVT in the past and that she recently drove to [place]. Pulses are present in her LLE and her skin color is appropriate. No warmth or redness noted.

## 2019-05-08 NOTE — ED PROVIDER NOTES
infectious    History of Hodgkin's lymphoma     recurrent hodgkin lymphoma, right hilum, in remission    Hx of stem cell transplant (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 Gianluca Dimas.    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 drug ([address]     bilateral neuropathy in feet    Obesity     Obstructive sleep apnea on CPAP     compliant    Peripheral polyneuropathy     Post-thoracotomy pain syndrome     Reactive airway disease     Tachycardia, unspecified     Upper respiratory tract infection          SURGICAL HISTORY       Past Surgical History:   Procedure Laterality Date    APPENDECTOMY      BLADDER SUSPENSION  5/12/2010    mesh mid urethral suspension at the time of her hysterectomy    BRONCHOSCOPY  08/2007, 2/2007, 06/2006    CARDIAC CATHETERIZATION  3/2/2011    normal    COLONOSCOPY  3/7/2013    normal; repeat 10 years, done at [facility].  (The dictated report is filed under the Media tab.)    COLONOSCOPY  02/06/2016    ISCHEMIC COLITIS ULCERATION OF DESCENDING AND SIGMOID COLON, Dr. Terence Smith, [facility], repeat recommended in 5 years    LAPAROSCOPY  05/1997    LYMPH NODE BIOPSY  4/23/2010    mediastinal lymph node    LYMPH NODE BIOPSY  04-23-10    MEDIASTINAL     MEDIASTINOTOMY  2/14/2006, 12/2005    CHAMBERLAIN PROCEDURE    OTHER SURGICAL HISTORY  JANUARY 2006    REMOVAL OF SUBCLAVIAN PORT    OTHER SURGICAL HISTORY      Ovarian cystectomy    OTHER SURGICAL HISTORY  02/19/2019    Veno[employer]ram - of inferior vena janette, bilateral common iliac veins bilateral exteranal iliac vein and bilateral common femoral Disease Father     Hypertension Mother    Johnson Kaveh Cataracts Mother     Glaucoma Neg Hx     Cancer Neg Hx     Diabetes Neg Hx           SOCIAL HISTORY       Social History     Socioeconomic History    Marital status:      Spouse name: None    Number of children: None    Years of education: None    Highest education level: None   Occupational History    None   Social Needs    Financial resource strain: None    Food insecurity:     Worry: None     Inability: None    Transportation needs:     Medical: None     Non-medical: None   Tobacco Use    Smoking status: Never Smoker    Smokeless tobacco: Never Used   Substance and Sexual Activity    Alcohol use: No     Alcohol/week: 0.0 oz    Drug use: No    Sexual activity: None   Lifestyle    Physical activity:     Days per week: None     Minutes per session: None    Stress: None   Relationships    Social connections:     Talks on phone: None     Gets together: None     Attends Church service: None     Active member of club or organization: None     Attends meetings of clubs or organizations: None     Relationship status: None    Intimate partner violence:     Fear of current or ex partner: None     Emotionally abused: None     Physically abused: None     Forced sexual activity: None   Other Topics Concern    None   Social History Narrative    None       SCREENINGS             PHYSICAL EXAM    (up to 7 for level 4, 8 or more for level 5)     ED Triage Vitals [05/07/19 2045]   BP Temp Temp Source Pulse Resp SpO2 Height Weight   135/75 97.9 °F (36.6 °C) Tympanic 104 18 94 % -- 223 lb (101.2 kg)       Physical Exam   Constitutional: She appears well-developed and well-nourished. No distress. HENT:   Head: Normocephalic and atraumatic. Mouth/Throat: Oropharynx is clear and moist.   Eyes: Conjunctivae are normal.   Pupils are equally round and reacting normally. Extraoccular muscles are grossly intact. Neck: Normal range of motion.  No tracheal deviation present. Cardiovascular: Normal rate, regular rhythm, normal heart sounds and intact distal pulses. Exam reveals no friction rub. No murmur heard. Pulmonary/Chest: Effort normal and breath sounds normal. No stridor. No respiratory distress. She has no wheezes. She has no rales. She exhibits no tenderness. Abdominal: Soft. She exhibits no distension and no mass. There is no tenderness. There is no rebound and no guarding. Musculoskeletal: Normal range of motion. She exhibits tenderness. She exhibits no edema or deformity. Left lateral thigh   Neurological: She is alert. Answering questions appropriately. No gaze deficit. No gait abnormality. Moving all extremities. Skin: Skin is warm and dry. Psychiatric: She has a normal mood and affect. Judgment normal.   Nursing note and vitals reviewed. EMERGENCY DEPARTMENT COURSE and DIFFERENTIAL DIAGNOSIS/MDM:   Vitals:    Vitals:    05/07/19 2045   BP: 135/75   Pulse: 104   Resp: 18   Temp: 97.9 °F (36.6 °C)   TempSrc: Tympanic   SpO2: 94%   Weight: 223 lb (101.2 kg)       Patient presents to the emergency department with the complaint described above. Vitals are grossly normal she is nontoxic in appearance. She is neurovascularly intact bilaterally in the lower extremities. I low suspicion this represents DVT but based on her history this will need to be ruled out with ultrasound. Because of her history of a non-Hodgkin's lymphoma have also ordered some routine blood work. DIAGNOSTIC RESULTS     LABS:  Labs Reviewed   BASIC METABOLIC PANEL - Abnormal; Notable for the following components:       Result Value    Glucose 154 (*)     Calcium 8.5 (*)     All other components within normal limits   CBC WITH AUTO DIFFERENTIAL   APTT   PROTIME-INR       All other labs were within normal range or not returned as of this dictation. RADIOLOGY:  VL DUP LOWER EXTREMITY VENOUS LEFT   Final Result   No evidence of DVT in the left lower extremity. Ultrasound negative. Blood work unremarkable. Patient given instructions to follow up with PCP as well as instructions to use anti-inflammatories, heat or ice and salt water bath for her musculoskeletal pain    At this time the patient is without objective evidence of an acute process requiring hospitalization or inpatient management. They have remained hemodynamically stable throughout their entire ED visit and are stable for discharge with outpatient follow-up. Standard anticipatory guidance given to patient upon discharge. Have given them a specific time frame in which to follow-up and who to follow-up with. I have also advised them that they should return to the emergency department if they get worse, or not getting better or develop any new or concerning symptoms. Patient demonstrates understanding. PROCEDURES:  Unless otherwise noted below, none     Procedures    FINAL IMPRESSION      1.  Left leg pain          DISPOSITION/PLAN   DISPOSITION Decision To Discharge 05/07/2019 10:16:25 PM      PATIENT REFERRED TO:  Denise Lanza MD  [address] [place] [address] [place]  [phone]    In 1 week        DISCHARGE MEDICATIONS:  New Prescriptions    No medications on file          (Please note that portions of this note were completed with a voice recognition program.  Efforts were made to edit the dictations but occasionally words are mis-transcribed.)    Julius Gottron, DO (electronically signed)  Board Certified Emergency Physician          Julius Gottron, DO  05/07/19 9892

## 2019-05-08 NOTE — PROGRESS NOTES
up visits with Dr. Radha Adkins. 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 drug (HCC)     bilateral neuropathy in feet    Obesity     Obstructive sleep apnea on CPAP     compliant    Peripheral polyneuropathy     Post-thoracotomy pain syndrome     Reactive airway disease     Tachycardia, unspecified     Upper respiratory tract infection           Social History     Tobacco Use    Smoking status: Never Smoker    Smokeless tobacco: Never Used   Substance Use Topics    Alcohol use: No     Alcohol/week: 0.0 oz     Current Outpatient Medications   Medication Sig Dispense Refill    albuterol sulfate HFA (PROVENTIL HFA) 108 (90 Base) MCG/ACT inhaler Inhale 2 puffs into the lungs every 4 hours as needed for Wheezing 1 Inhaler 0    zolpidem (AMBIEN) 5 MG tablet Take 1 tablet by mouth nightly as needed for Sleep for up to 90 days. . 90 tablet 0    montelukast (SINGULAIR) 10 MG tablet Take 1 tablet by mouth nightly 30 tablet 11    atorvastatin (LIPITOR) 80 MG tablet Take 1 tablet by mouth daily 90 tablet 1    venlafaxine (EFFEXOR XR) 75 MG extended release capsule Take 2 capsules by mouth daily 180 capsule 1    triamcinolone (ARISTOCORT) 0.5 % cream Apply topically 3 times daily. 1 Tube 0    mometasone-formoterol (DULERA) 200-5 MCG/ACT inhaler Inhale 2 puffs into the lungs every 12 hours 3 Inhaler 3    estrogens, conjugated,-methyltestosterone (ESTRATEST HS) 0.625-1.25 MG per tablet Take 1 tablet by mouth daily. . 90 tablet 1    UNABLE TO FIND Massage therapy once a week as needed. Diagnosis low back pain.  1 each 0    Ascorbic Acid (VITAMIN C) 250 MG CHEW Take 1 tablet by mouth daily      ibuprofen (ADVIL;MOTRIN) 200 MG tablet Take 200 mg by mouth every 6 hours as needed for Pain      albuterol (PROVENTIL) (2.5 MG/3ML) 0.083% nebulizer solution Take 3 mLs by nebulization every 4 hours as needed for Wheezing or Shortness of Breath 120 each 0    albuterol (PROAIR HFA) 108 (90 BASE) MCG/ACT inhaler Inhale 2 puffs into the lungs every 4 hours as needed for Wheezing 3 Inhaler 1     No current facility-administered medications for this visit. Allergies   Allergen Reactions    Compazine [Prochlorperazine] Other (See Comments)     Felt shaky.  Tape [place] Tape] Rash       Subjective:     Review of Systems   Constitutional: Negative for activity change, appetite change, chills, fatigue and fever. Respiratory: Negative for cough, chest tightness, shortness of breath and wheezing. Cardiovascular: Negative for chest pain, palpitations and leg swelling. Musculoskeletal: Positive for arthralgias (left thigh). Negative for gait problem and joint swelling. Skin: Negative for color change, rash and wound. Neurological: Negative for dizziness, syncope, weakness and light-headedness. Objective:      Physical Exam   Constitutional: She is oriented to person, place, and time. She appears well-developed and well-nourished. No distress. Eyes: Conjunctivae are normal.   Neck: Normal range of motion. Neck supple. No thyromegaly present. Cardiovascular: Normal rate, regular rhythm, normal heart sounds and intact distal pulses. No murmur heard. Pulmonary/Chest: Effort normal and breath sounds normal. No respiratory distress. She has no wheezes. Musculoskeletal: She exhibits no edema. Left upper leg: She exhibits tenderness. She exhibits no swelling, no edema and no deformity. Legs:  Lymphadenopathy:     She has no cervical adenopathy. Neurological: She is alert and oriented to person, place, and time. Skin: Skin is warm and dry. No rash noted. No erythema. Nursing note and vitals reviewed.     /84 (Site: Left Upper Arm, Position: Sitting, Cuff Size: Large Adult)   Pulse 74   Temp 98 °F (36.7 °C) (Tympanic)   Wt 224 lb (101.6 kg)   LMP  (LMP Unknown)   SpO2 98%   BMI 36.71 kg/m²     Assessment:       Diagnosis Orders   1. Acute pain of left thigh               Plan:        Left thigh pain: new; most likely superficial thrombophlebitis vs musculoskeletal cause but I cannot rule out DVT so I sent her to the ER for an ultrasound. If her ultrasound is negative I recommended tylenol, NSAIDs, heat and ice. Return if symptoms worsen or fail to improve. Patientgiven educational materials - see patient instructions. Discussed use, benefit,and side effects of prescribed medications. All patient questions answered. Ptvoiced understanding. Reviewed health maintenance. Instructed to continue currentmedications, diet and exercise. Patient agreed with treatment plan. Follow up asdirected.      Electronically signed by Emery Zacarias MD on 5/7/2019 at 8:21 PM

## 2019-05-10 ENCOUNTER — OFFICE VISIT (OUTPATIENT)
Dept: PULMONOLOGY | Age: 54
End: 2019-05-10
Payer: MEDICARE

## 2019-05-10 VITALS
HEIGHT: 66 IN | OXYGEN SATURATION: 95 % | RESPIRATION RATE: 12 BRPM | WEIGHT: 229 LBS | DIASTOLIC BLOOD PRESSURE: 85 MMHG | SYSTOLIC BLOOD PRESSURE: 120 MMHG | BODY MASS INDEX: 36.8 KG/M2 | HEART RATE: 108 BPM

## 2019-05-10 DIAGNOSIS — G47.33 OSA (OBSTRUCTIVE SLEEP APNEA): ICD-10-CM

## 2019-05-10 DIAGNOSIS — Z85.71 HISTORY OF HODGKIN'S LYMPHOMA: ICD-10-CM

## 2019-05-10 DIAGNOSIS — Z94.84 HX OF STEM CELL TRANSPLANT (HCC): ICD-10-CM

## 2019-05-10 DIAGNOSIS — J06.9 VIRAL UPPER RESPIRATORY TRACT INFECTION: Primary | ICD-10-CM

## 2019-05-10 DIAGNOSIS — J45.20 MILD INTERMITTENT REACTIVE AIRWAY DISEASE WITHOUT COMPLICATION: ICD-10-CM

## 2019-05-10 PROCEDURE — 99213 OFFICE O/P EST LOW 20 MIN: CPT | Performed by: INTERNAL MEDICINE

## 2019-05-10 RX ORDER — AZITHROMYCIN 250 MG/1
250 TABLET, FILM COATED ORAL SEE ADMIN INSTRUCTIONS
Qty: 6 TABLET | Refills: 0 | Status: SHIPPED | OUTPATIENT
Start: 2019-05-10 | End: 2019-05-15

## 2019-05-10 ASSESSMENT — ENCOUNTER SYMPTOMS
EYES NEGATIVE: 1
WHEEZING: 1
SINUS PRESSURE: 1
GASTROINTESTINAL NEGATIVE: 1
APNEA: 1
COUGH: 1
SHORTNESS OF BREATH: 1

## 2019-05-10 NOTE — PROGRESS NOTES
Subjective:      Patient ID: Jessica Juarez is a 49 y.o. female. HPI  Follow-up visit for asthma and sleep apnea. States that developed an upper respiratory tract infection over Easter. No fever or chills but cough and wheezing. Seen in urgent care and given prednisone for 5 days. Some better but cough persists. Much nasal congestion and drainage. On Dulera 200 µg, Singulair 10 mg, and Ventolin HFA. Cough similar to previous. Patient no longer on CPAP. States that she lost her insurance and the machine was confiscated.  reports loud snoring and observed apneas. Patient reports unrefreshing sleep and excessive daytime sleepiness. She wishes to get back on CPAP. Review of Systems   Constitutional: Negative. HENT: Positive for sinus pressure. Eyes: Negative. Respiratory: Positive for apnea, cough, shortness of breath and wheezing. Cardiovascular: Negative. Gastrointestinal: Negative. All other systems reviewed and are negative. Objective:     Physical Exam   Constitutional: She is oriented to person, place, and time. She appears well-developed and well-nourished. Coughed during the entire interview. HENT:   Head: Normocephalic. Mouth/Throat: Oropharynx is clear and moist.   Eyes: Conjunctivae are normal. No scleral icterus. Neck: Neck supple. No JVD present. No tracheal deviation present. No thyromegaly present. Cardiovascular: Normal rate, regular rhythm and normal heart sounds. Exam reveals no gallop. No murmur heard. Pulmonary/Chest: Effort normal. No respiratory distress. She has no wheezes. She has no rales. She exhibits no tenderness. Abdominal: Soft. There is no tenderness. Musculoskeletal: She exhibits no edema. Lymphadenopathy:     She has no cervical adenopathy. Neurological: She is alert and oriented to person, place, and time. Skin: Skin is warm and dry. Nursing note and vitals reviewed.       Wt Readings from Last 3 Encounters: 05/10/19 229 lb (103.9 kg)   05/07/19 223 lb (101.2 kg)   05/07/19 224 lb (101.6 kg)          Results for orders placed or performed during the hospital encounter of 05/07/19   CBC Auto Differential   Result Value Ref Range    WBC 9.0 3.5 - 11.0 k/uL    RBC 4.26 4.0 - 5.2 m/uL    Hemoglobin 13.2 12.0 - 16.0 g/dL    Hematocrit 39.8 36 - 46 %    MCV 93.4 80 - 100 fL    MCH 31.1 26 - 34 pg    MCHC 33.2 31 - 37 g/dL    RDW 13.5 11.0 - 14.5 %    Platelets 195 626 - 011 k/uL    MPV 8.3 6.0 - 12.0 fL    NRBC Automated NOT REPORTED per 100 WBC    Differential Type NOT REPORTED     Immature Granulocytes NOT REPORTED 0 %    Absolute Immature Granulocyte NOT REPORTED 0.00 - 0.30 k/uL    WBC Morphology NOT REPORTED     RBC Morphology NOT REPORTED     Platelet Estimate NOT REPORTED     Seg Neutrophils 55 43 - 77 %    Lymphocytes 32 16 - 46 %    Monocytes 7 4 - 11 %    Eosinophils % 5 1 - 7 %    Basophils 1 0 - 1 %    Segs Absolute 5.00 1.8 - 7.7 k/uL    Absolute Lymph # 2.90 1.0 - 4.8 k/uL    Absolute Mono # 0.60 0.1 - 1.2 k/uL    Absolute Eos # 0.40 0.0 - 0.4 k/uL    Basophils # 0.10 0.0 - 0.2 k/uL   Basic Metabolic Panel   Result Value Ref Range    Glucose 154 (H) 70 - 99 mg/dL    BUN 12 6 - 20 mg/dL    CREATININE 0.74 0.50 - 0.90 mg/dL    Bun/Cre Ratio 16 9 - 20    Calcium 8.5 (L) 8.6 - 10.4 mg/dL    Sodium 139 135 - 144 mmol/L    Potassium 4.2 3.7 - 5.3 mmol/L    Chloride 99 98 - 107 mmol/L    CO2 30 20 - 31 mmol/L    Anion Gap 10 9 - 17 mmol/L    GFR Non-African American >60 >60 mL/min    GFR African American >60 >60 mL/min    GFR Comment          GFR Staging NOT REPORTED    APTT   Result Value Ref Range    PTT 29.8 27.0 - 35.0 sec   Protime-INR   Result Value Ref Range    Protime 11.2 9.4 - 11.3 sec    INR 1.1        Assessment:         1. Viral upper respiratory tract infection    2. Mild intermittent reactive airway disease without complication    3. Hx of stem cell transplant ([facility] [address]    4.  History of Hodgkin's lymphoma

## 2019-05-15 ENCOUNTER — OFFICE VISIT (OUTPATIENT)
Dept: FAMILY MEDICINE CLINIC | Age: 54
End: 2019-05-15
Payer: MEDICARE

## 2019-05-15 ENCOUNTER — HOSPITAL ENCOUNTER (OUTPATIENT)
Dept: GENERAL RADIOLOGY | Age: 54
Discharge: HOME | End: 2019-05-17
Payer: MEDICARE

## 2019-05-15 VITALS
HEIGHT: 66 IN | BODY MASS INDEX: 36.71 KG/M2 | WEIGHT: 228.4 LBS | HEART RATE: 86 BPM | DIASTOLIC BLOOD PRESSURE: 82 MMHG | SYSTOLIC BLOOD PRESSURE: 108 MMHG | RESPIRATION RATE: 16 BRPM

## 2019-05-15 DIAGNOSIS — Z12.39 SCREENING FOR BREAST CANCER: ICD-10-CM

## 2019-05-15 DIAGNOSIS — R73.01 ELEVATED FASTING GLUCOSE: ICD-10-CM

## 2019-05-15 DIAGNOSIS — M25.552 PAIN IN LEFT HIP: ICD-10-CM

## 2019-05-15 DIAGNOSIS — F33.40 RECURRENT MAJOR DEPRESSIVE DISORDER, IN REMISSION (HCC): ICD-10-CM

## 2019-05-15 DIAGNOSIS — M79.652 LEFT THIGH PAIN: ICD-10-CM

## 2019-05-15 DIAGNOSIS — E78.2 MIXED HYPERLIPIDEMIA: ICD-10-CM

## 2019-05-15 DIAGNOSIS — F51.01 PRIMARY INSOMNIA: Primary | ICD-10-CM

## 2019-05-15 PROCEDURE — 72170 X-RAY EXAM OF PELVIS: CPT

## 2019-05-15 PROCEDURE — 73552 X-RAY EXAM OF FEMUR 2/>: CPT

## 2019-05-15 PROCEDURE — 99214 OFFICE O/P EST MOD 30 MIN: CPT | Performed by: FAMILY MEDICINE

## 2019-05-15 RX ORDER — VENLAFAXINE HYDROCHLORIDE 75 MG/1
150 CAPSULE, EXTENDED RELEASE ORAL DAILY
Qty: 180 CAPSULE | Refills: 1 | Status: SHIPPED | OUTPATIENT
Start: 2019-05-15 | End: 2019-08-14 | Stop reason: SDUPTHER

## 2019-05-15 RX ORDER — ZOLPIDEM TARTRATE 5 MG/1
5 TABLET ORAL NIGHTLY PRN
Qty: 90 TABLET | Refills: 0 | Status: SHIPPED | OUTPATIENT
Start: 2019-05-15 | End: 2019-08-14 | Stop reason: SDUPTHER

## 2019-05-15 RX ORDER — ATORVASTATIN CALCIUM 80 MG/1
80 TABLET, FILM COATED ORAL DAILY
Qty: 90 TABLET | Refills: 1 | Status: SHIPPED | OUTPATIENT
Start: 2019-05-15 | End: 2020-01-15 | Stop reason: SDUPTHER

## 2019-05-15 ASSESSMENT — PATIENT HEALTH QUESTIONNAIRE - PHQ9
2. FEELING DOWN, DEPRESSED OR HOPELESS: 0
SUM OF ALL RESPONSES TO PHQ QUESTIONS 1-9: 0
1. LITTLE INTEREST OR PLEASURE IN DOING THINGS: 0
SUM OF ALL RESPONSES TO PHQ9 QUESTIONS 1 & 2: 0
SUM OF ALL RESPONSES TO PHQ QUESTIONS 1-9: 0

## 2019-05-15 NOTE — PATIENT INSTRUCTIONS
when you lie down, start a worry book. Well before bedtime, write down your worries, and then set the book and your concerns aside. · Try meditation or other relaxation techniques before you go to bed. · If you cannot fall asleep, get up and go to another room until you feel sleepy. Do something relaxing. Repeat your bedtime routine before you go to bed again. · Make your house quiet and calm about an hour before bedtime. Turn down the lights, turn off the TV, log off the computer, and turn down the volume on music. This can help you relax after a busy day. When should you call for help? Watch closely for changes in your health, and be sure to contact your doctor if:    · Your efforts to improve your sleep do not work.     · Your insomnia gets worse.     · You have been feeling down, depressed, or hopeless or have lost interest in things that you usually enjoy. Where can you learn more? Go to [email or web address] org and sign in to your [employer] account. Enter P513 in the [employer] box to learn more about \"Insomnia: Care Instructions. \"     If you do not have an account, please click on the \"Sign Up Now\" link. Current as of: June 28, 2018  Content Version: 12.0  © 9192-7773 [employer]. Care instructions adapted under license by [facility] ([facility]). If you have questions about a medical condition or this instruction, always ask your healthcare professional. [place] [address] any warranty or liability for your use of this information.

## 2019-05-15 NOTE — PROGRESS NOTES
[address], [facility] [address]                        Telephone [phone]             Fax [phone]     Binu Fragoso  1965  MRN:  [id number]  Date of visit:  5/15/2019    Subjective:    Binu Fragoso is a 49 y.o.  female who presents to [facility] today (5/15/2019) for follow up/evaluation of: Insomnia (3 month follow up); Leg Pain (left upper leg pain for couple weeks denies injury,seen in [facility] ER 05/07/19); and Depression (3 month follow up)      She states that she has had pain in the left upper leg. She thought she may have pulled a muscle when she was playing with her grandchildren. The pain has persisted, so she went to [facility] ER on 5/7/2019. She had a Doppler that was negative. She has been taking Ibuprofen for the pain, which does help. She feels that she is doing well with her current dose of Effexor. She is tolerating all of her medications well. She continues to use Ambien regularly. She has the following problem list:  Patient Active Problem List   Diagnosis    Interstitial lung disease ([address]    Depression    Reactive airway disease    Tachycardia    Obesity (BMI 30-39. 9)    Post-thoracotomy pain syndrome    Hx of stem cell transplant ([address]    Mixed hyperlipidemia    History of Hodgkin's lymphoma    History of DVT (deep vein thrombosis)    Primary insomnia    Neuropathy due to chemotherapeutic drug ([address]    High risk medication use    Myopia of both eyes with astigmatism    Elevated fasting glucose    Rectal bleeding    Ischemic colitis ([address]    bilateral iliac vein compression    Bacteremia    Hodgkin's disease ([address]    Sarcoidosis    Thromboembolic disorder ([address]    History of autologous stem cell transplant ([address]    Pneumonitis    Mixed hyperlipidemia        Current medications are:  Outpatient Medications Marked as Taking for the 5/15/19 encounter (Office Visit) with Margaretmary Scheuermann, MD   Medication Sig Dispense Refill    azithromycin (ZITHROMAX) 250 MG tablet Take 1 tablet by mouth See Admin Instructions for 5 days 500mg on day 1 followed by 250mg on days 2 - 5 6 tablet 0    albuterol sulfate HFA (PROVENTIL HFA) 108 (90 Base) MCG/ACT inhaler Inhale 2 puffs into the lungs every 4 hours as needed for Wheezing 1 Inhaler 0    zolpidem (AMBIEN) 5 MG tablet Take 1 tablet by mouth nightly as needed for Sleep for up to 90 days. . 90 tablet 0    montelukast (SINGULAIR) 10 MG tablet Take 1 tablet by mouth nightly 30 tablet 11    atorvastatin (LIPITOR) 80 MG tablet Take 1 tablet by mouth daily 90 tablet 1    venlafaxine (EFFEXOR XR) 75 MG extended release capsule Take 2 capsules by mouth daily 180 capsule 1    triamcinolone (ARISTOCORT) 0.5 % cream Apply topically 3 times daily. 1 Tube 0    mometasone-formoterol (DULERA) 200-5 MCG/ACT inhaler Inhale 2 puffs into the lungs every 12 hours 3 Inhaler 3    UNABLE TO FIND Massage therapy once a week as needed. Diagnosis low back pain. 1 each 0    Ascorbic Acid (VITAMIN C) 250 MG CHEW Take 1 tablet by mouth daily      ibuprofen (ADVIL;MOTRIN) 200 MG tablet Take 200 mg by mouth every 6 hours as needed for Pain      albuterol (PROVENTIL) (2.5 MG/3ML) 0.083% nebulizer solution Take 3 mLs by nebulization every 4 hours as needed for Wheezing or Shortness of Breath 120 each 0    albuterol (PROAIR HFA) 108 (90 BASE) MCG/ACT inhaler Inhale 2 puffs into the lungs every 4 hours as needed for Wheezing 3 Inhaler 1       She is allergic to compazine [prochlorperazine] and tape [adhesive tape]. She  reports that she has never smoked.  She has never used smokeless tobacco.      Objective:    Vitals:    05/15/19 1524   BP: 108/82   Site: Right Upper Arm   Position: Sitting   Cuff Size: Large Adult   Pulse: 86   Resp: 16   Weight: 228 lb 6.4 oz (103.6 kg) Height: 5' 6\" (1.676 m)     Body mass index is 36.86 kg/m². Obese  female, healthy-appearing, alert, cooperative and in no distress. Neck supple. No adenopathy. Thyroid symmetric, normal size. Chest:  Normal expansion. Clear to auscultation. No rales, rhonchi, or wheezing. Heart sounds are normal.  Regular rate and rhythm without murmur, gallop or rub. Lower extremities have no edema. There is good range of motion of the left hip. There is mild tenderness to palpation of the left hip anteriorly. Affect is bright. Thought processes are logical. There is no evidence of paranoia or delusions. Responses to questions are appropriate. Dress and grooming are appropriate.      Labs done 5/7/2019 were reviewed with the patient:   Admission on 05/07/2019, Discharged on 05/07/2019   Component Date Value Ref Range Status    WBC 05/07/2019 9.0  3.5 - 11.0 k/uL Final    RBC 05/07/2019 4.26  4.0 - 5.2 m/uL Final    Hemoglobin 05/07/2019 13.2  12.0 - 16.0 g/dL Final    Hematocrit 05/07/2019 39.8  36 - 46 % Final    MCV 05/07/2019 93.4  80 - 100 fL Final    MCH 05/07/2019 31.1  26 - 34 pg Final    MCHC 05/07/2019 33.2  31 - 37 g/dL Final    RDW 05/07/2019 13.5  11.0 - 14.5 % Final    Platelets 58/07/5951 196  140 - 450 k/uL Final    MPV 05/07/2019 8.3  6.0 - 12.0 fL Final    NRBC Automated 05/07/2019 NOT REPORTED  per 100 WBC Final    Differential Type 05/07/2019 NOT REPORTED   Final    Immature Granulocytes 05/07/2019 NOT REPORTED  0 % Final    Absolute Immature Granulocyte 05/07/2019 NOT REPORTED  0.00 - 0.30 k/uL Final    WBC Morphology 05/07/2019 NOT REPORTED   Final    RBC Morphology 05/07/2019 NOT REPORTED   Final    Platelet Estimate 99/66/0275 NOT REPORTED   Final    Seg Neutrophils 05/07/2019 55  43 - 77 % Final    Lymphocytes 05/07/2019 32  16 - 46 % Final    Monocytes 05/07/2019 7  4 - 11 % Final    Eosinophils % 05/07/2019 5  1 - 7 % Final    Basophils 05/07/2019 1  0 - 1 % Final    Segs Absolute 05/07/2019 5.00  1.8 - 7.7 k/uL Final    Absolute Lymph # 05/07/2019 2.90  1.0 - 4.8 k/uL Final    Absolute Mono # 05/07/2019 0.60  0.1 - 1.2 k/uL Final    Absolute Eos # 05/07/2019 0.40  0.0 - 0.4 k/uL Final    Basophils # 05/07/2019 0.10  0.0 - 0.2 k/uL Final    Glucose 05/07/2019 154* 70 - 99 mg/dL Final    BUN 05/07/2019 12  6 - 20 mg/dL Final    CREATININE 05/07/2019 0.74  0.50 - 0.90 mg/dL Final    Bun/Cre Ratio 05/07/2019 16  9 - 20 Final    Calcium 05/07/2019 8.5* 8.6 - 10.4 mg/dL Final    Sodium 05/07/2019 139  135 - 144 mmol/L Final    Potassium 05/07/2019 4.2  3.7 - 5.3 mmol/L Final    Chloride 05/07/2019 99  98 - 107 mmol/L Final    CO2 05/07/2019 30  20 - 31 mmol/L Final    Anion Gap 05/07/2019 10  9 - 17 mmol/L Final    GFR Non- 05/07/2019 >60  >60 mL/min Final    GFR  05/07/2019 >60  >60 mL/min Final    GFR Comment 05/07/2019        Final    Comment: Average GFR for 52-63 years old:   80 mL/min/1.73sq m  Chronic Kidney Disease:   <60 mL/min/1.73sq m  Kidney failure:   <15 mL/min/1.73sq m              eGFR calculated using average adult body mass. Additional eGFR calculator available at:        [email or web address]            GFR Staging 05/07/2019 NOT REPORTED   Final    PTT 05/07/2019 29.8  27.0 - 35.0 sec Final    Protime 05/07/2019 11.2  9.4 - 11.3 sec Final    INR 05/07/2019 1.1   Final    Comment:       Therapeutic Range:  Normal:     INR = 0.8-1.3  Venous Thrombosis Prevention:     INR = 2.0-3.0  Mechanical Heart Valve Replacement:     INR = 2.5-3.5                 Assessment and Plan:    1. Primary insomnia  I have encouraged her to try to decrease her dose of Ambien. I have encouraged her to try to take half a tablet of Ambien.   Controlled substances monitoring: No signs of potential drug abuse or diversion identified when the [place] from [place], [place], and [place] was reviewed today.  The activity on the report was consistent with the treatment plan. - zolpidem (AMBIEN) 5 MG tablet; Take 1 tablet by mouth nightly as needed for Sleep for up to 90 days. Dispense: 90 tablet; Refill: 0    Printed information regarding Insomnia was provided to the patient with her after visit summary. 2. Mixed hyperlipidemia  Her lipid profile was very good except for elevated triglycerides on 1/15/2019:   Lab Results   Component Value Date    CHOL 114 01/15/2019    TRIG 191 01/15/2019    HDL 39 01/15/2019    LDLCHOLESTEROL 36.8 01/15/2019        She is tolerating Lipitor well; this was refilled:   - atorvastatin (LIPITOR) 80 MG tablet; Take 1 tablet by mouth daily  Dispense: 90 tablet; Refill: 1    - Lipid Panel; Future prior to her return visit in 4 months. 3. Recurrent major depressive disorder, in remission ([facility] [address]  She is doing well with her current dose of Effexor; this was refilled:  - venlafaxine (EFFEXOR XR) 75 MG extended release capsule; Take 2 capsules by mouth daily  Dispense: 180 capsule; Refill: 1    4. Pain in left hip  5. Left thigh pain  - XR PELVIS (1-2 VIEWS); Future  - XR FEMUR LEFT (MIN 2 VIEWS); Future    She will be contacted when the radiologist's interpretation of her x-rays is available. 6. Elevated fasting glucose  Her HgbA1c is within normal limits. I recommended that she decrease her intake of processed carbohydrates, begin a regular exercise program, and attempt to lose weight. Labs were ordered to be done prior to her return visit in 4 months. :  - Comprehensive Metabolic Panel; Future  - Hemoglobin A1C; Future    7. Routine health maintenance  Health maintenance was reviewed with the patient. Screening mammogram was recommended and ordered. HIV screening was recommended and declined. She is on a waiting list for [facility]. All other health maintenance, including Tdap and Prevnar-13, is up to date at this time. She is due for a Pneumovax-23.    I will discuss this with her at her next office visit.     (Please note that portions of this note were completed with a voice-recognition program. Efforts were made to edit the dictation but occasionally words are mis-transcribed.)

## 2019-05-15 NOTE — PROGRESS NOTES
Refused HIV testing,on waiting list for shingrix vaccine at [facility],pneumonia vaccine at this time.

## 2019-05-17 DIAGNOSIS — M25.552 LEFT HIP PAIN: Primary | ICD-10-CM

## 2019-05-17 DIAGNOSIS — M79.605 LEFT LEG PAIN: ICD-10-CM

## 2019-08-13 DIAGNOSIS — F33.40 RECURRENT MAJOR DEPRESSIVE DISORDER, IN REMISSION (HCC): ICD-10-CM

## 2019-08-13 DIAGNOSIS — F51.01 PRIMARY INSOMNIA: ICD-10-CM

## 2019-08-14 RX ORDER — VENLAFAXINE HYDROCHLORIDE 75 MG/1
150 CAPSULE, EXTENDED RELEASE ORAL DAILY
Qty: 180 CAPSULE | Refills: 1 | Status: SHIPPED | OUTPATIENT
Start: 2019-08-14 | End: 2020-01-15 | Stop reason: SDUPTHER

## 2019-08-14 RX ORDER — ZOLPIDEM TARTRATE 5 MG/1
5 TABLET ORAL NIGHTLY PRN
Qty: 90 TABLET | Refills: 0 | Status: SHIPPED | OUTPATIENT
Start: 2019-08-14 | End: 2019-11-26 | Stop reason: SDUPTHER

## 2019-08-16 ENCOUNTER — HOSPITAL ENCOUNTER (OUTPATIENT)
Dept: MAMMOGRAPHY | Age: 54
Discharge: HOME | End: 2019-08-18
Payer: MEDICARE

## 2019-08-16 DIAGNOSIS — Z12.39 SCREENING FOR BREAST CANCER: ICD-10-CM

## 2019-08-16 PROCEDURE — 77063 BREAST TOMOSYNTHESIS BI: CPT

## 2019-09-17 ENCOUNTER — HOSPITAL ENCOUNTER (OUTPATIENT)
Dept: LAB | Age: 54
Discharge: HOME | End: 2019-09-17
Payer: MEDICARE

## 2019-09-17 DIAGNOSIS — E78.2 MIXED HYPERLIPIDEMIA: ICD-10-CM

## 2019-09-17 DIAGNOSIS — R73.01 ELEVATED FASTING GLUCOSE: ICD-10-CM

## 2019-09-17 LAB
ALBUMIN SERPL-MCNC: 4.2 G/DL (ref 3.5–5.2)
ALBUMIN/GLOBULIN RATIO: 1.4 (ref 1–2.5)
ALP BLD-CCNC: 94 U/L (ref 35–104)
ALT SERPL-CCNC: 32 U/L (ref 5–33)
ANION GAP SERPL CALCULATED.3IONS-SCNC: 11 MMOL/L (ref 9–17)
AST SERPL-CCNC: 27 U/L
BILIRUB SERPL-MCNC: 0.4 MG/DL (ref 0.3–1.2)
BUN BLDV-MCNC: 10 MG/DL (ref 6–20)
BUN/CREAT BLD: 15 (ref 9–20)
CALCIUM SERPL-MCNC: 8.9 MG/DL (ref 8.6–10.4)
CHLORIDE BLD-SCNC: 101 MMOL/L (ref 98–107)
CHOLESTEROL/HDL RATIO: 5.1
CHOLESTEROL: 177 MG/DL
CO2: 28 MMOL/L (ref 20–31)
CREAT SERPL-MCNC: 0.66 MG/DL (ref 0.5–0.9)
ESTIMATED AVERAGE GLUCOSE: 123 MG/DL
GFR AFRICAN AMERICAN: >60 ML/MIN
GFR NON-AFRICAN AMERICAN: >60 ML/MIN
GFR SERPL CREATININE-BSD FRML MDRD: ABNORMAL ML/MIN/{1.73_M2}
GFR SERPL CREATININE-BSD FRML MDRD: ABNORMAL ML/MIN/{1.73_M2}
GLUCOSE BLD-MCNC: 132 MG/DL (ref 70–99)
HBA1C MFR BLD: 5.9 % (ref 4.8–5.9)
HDLC SERPL-MCNC: 35 MG/DL
LDL CHOLESTEROL: 78 MG/DL (ref 0–130)
POTASSIUM SERPL-SCNC: 3.9 MMOL/L (ref 3.7–5.3)
SODIUM BLD-SCNC: 140 MMOL/L (ref 135–144)
TOTAL PROTEIN: 7.3 G/DL (ref 6.4–8.3)
TRIGL SERPL-MCNC: 321 MG/DL
VLDLC SERPL CALC-MCNC: ABNORMAL MG/DL (ref 1–30)

## 2019-09-17 PROCEDURE — 80053 COMPREHEN METABOLIC PANEL: CPT

## 2019-09-17 PROCEDURE — 36415 COLL VENOUS BLD VENIPUNCTURE: CPT

## 2019-09-17 PROCEDURE — 83036 HEMOGLOBIN GLYCOSYLATED A1C: CPT

## 2019-09-17 PROCEDURE — 80061 LIPID PANEL: CPT

## 2019-09-18 ENCOUNTER — OFFICE VISIT (OUTPATIENT)
Dept: FAMILY MEDICINE CLINIC | Age: 54
End: 2019-09-18
Payer: MEDICARE

## 2019-09-18 VITALS
DIASTOLIC BLOOD PRESSURE: 78 MMHG | HEIGHT: 66 IN | BODY MASS INDEX: 36.03 KG/M2 | SYSTOLIC BLOOD PRESSURE: 126 MMHG | WEIGHT: 224.2 LBS | HEART RATE: 80 BPM

## 2019-09-18 DIAGNOSIS — E78.2 MIXED HYPERLIPIDEMIA: ICD-10-CM

## 2019-09-18 DIAGNOSIS — G47.00 INSOMNIA, UNSPECIFIED TYPE: ICD-10-CM

## 2019-09-18 DIAGNOSIS — R19.7 DIARRHEA, UNSPECIFIED TYPE: ICD-10-CM

## 2019-09-18 DIAGNOSIS — Z23 NEED FOR PROPHYLACTIC VACCINATION AGAINST STREPTOCOCCUS PNEUMONIAE (PNEUMOCOCCUS): ICD-10-CM

## 2019-09-18 DIAGNOSIS — R21 RASH: ICD-10-CM

## 2019-09-18 DIAGNOSIS — E11.9 TYPE 2 DIABETES MELLITUS WITHOUT COMPLICATION, WITHOUT LONG-TERM CURRENT USE OF INSULIN (HCC): Primary | ICD-10-CM

## 2019-09-18 DIAGNOSIS — J84.9 INTERSTITIAL LUNG DISEASE (HCC): ICD-10-CM

## 2019-09-18 PROCEDURE — 99214 OFFICE O/P EST MOD 30 MIN: CPT | Performed by: FAMILY MEDICINE

## 2019-09-18 PROCEDURE — 90732 PPSV23 VACC 2 YRS+ SUBQ/IM: CPT | Performed by: FAMILY MEDICINE

## 2019-09-18 PROCEDURE — G0009 ADMIN PNEUMOCOCCAL VACCINE: HCPCS | Performed by: FAMILY MEDICINE

## 2019-09-18 RX ORDER — METFORMIN HYDROCHLORIDE 500 MG/1
500 TABLET, EXTENDED RELEASE ORAL
Qty: 90 TABLET | Refills: 1 | Status: SHIPPED | OUTPATIENT
Start: 2019-09-18 | End: 2020-01-01 | Stop reason: SDUPTHER

## 2019-09-18 NOTE — PROGRESS NOTES
Flu vaccine will be given at [facility]. Patient declined HIV screen and shingles vaccine at this time.     AWV scheduled with nurse on 9/26/19 @ 9:00AM

## 2019-09-18 NOTE — PATIENT INSTRUCTIONS
Patient Education        Learning About the Low FODMAP Diet for Irritable Bowel Syndrome (IBS)  What is the low-FODMAP diet? A low-FODMAP diet is a way to find out what foods give you digestion problems. You stop eating certain high-FODMAP foods for about 2 months. Then you add them back to see how your body reacts. This is called a \"challenge diet. \" A dietitian or doctor can help you follow this diet. FODMAPs are carbohydrates. They are in many types of foods. FODMAP stands for:  · Fermentable. · Oligosaccharides. · Disaccharides. · Monosaccharides. · And polyols. If you have digestive problems, some of these foods can make your symptoms worse. When you are on this diet, you can still eat certain fruits and vegetables. You can also eat certain grains, meats, fish, and lactose-free milks. What is it used for? If you have irritable bowel syndrome (IBS), you can ease your symptoms by not eating some types of foods. Some people also use this diet for inflammatory bowel disease (IBD) or some food intolerances. High-FODMAP foods can be hard to digest. They pull more fluid into your intestines. They are also easily fermented. This can lead to bloating, belly pain, gas, and diarrhea. The low-FODMAP diet can help you figure out what foods to avoid. And it can help you find foods that are easier to digest.  This diet can help with symptoms of some digestive diseases. But it's not a cure. You will still need to manage your condition. How does it work? You will work with a doctor or dietitian when you start the diet. At first, you won't eat any high-FODMAP foods for a few weeks. Go to www.[employer]. [employer] to learn more about this diet. Michell White also find links to an maurizio for your phone or other device. You'll find low-FODMAP cookbooks there too. After 6 to 8 weeks, you will start to try high-FODMAP foods again. You will add those foods back to your diet, one group at a time.  Your doctor or dietitian will probably have you wait a few days before you add each new group of those foods. Keep a food diary. You can write down the foods you try and note how they make you feel. After a few weeks, you may have a better idea of what foods you should avoid and what foods make you feel your best.  What are the risks? There is some risk of not getting all of the vitamins and nutrients you need on the low-FODMAP diet. These include:  · Folate. · Thiamin. · Vitamin B6.  · Calcium. · Vitamin D. Your dietitian or doctor can help you find other sources of these if needed. This diet may limit your fiber intake. Try to plan your meals to include other sources of fiber. What foods are on the low-FODMAP diet? Here is a guide to foods that you can eat, plus the foods that you should avoid, when you are on the low-FODMAP diet. Grains  Okay to eat: Foods made from grains like arrowroot, buckwheat, corn, millet, and oats. You can also eat potato, quinoa, rice, sorghum, tapioca, and teff. Cereals, pasta, breads, corn tortillas and baked goods made from these grains are also okay. (These grains may be labeled \"gluten-free. \")  Avoid: Grains like wheat, barley, and rye. Avoid ingredients such as bulgur, couscous, durum, and semolina. And avoid cereals, breads, and pastas made from these grains. Avoid chickpea, lentil, and pea flour. Proteins  Okay to eat: Most meat, fish, and eggs without high-FODMAP sauces. You can have small amounts of almonds or hazelnuts (10 nuts). Macadamia nuts, peanuts, pecans, pine nuts, and walnuts are also okay. You can also eat gonzalo and pumpkin seeds, tofu, and tempeh. Avoid: Beans, chickpeas, lentils, and soybeans. Avoid pistachio and cashew nuts. And some sausages may have high-FODMAP ingredients. Dairy  Okay to eat: Lactose-free dairy milks. Rice milk and almond milk are okay. So are lactose-free yogurts, kefirs, ice creams, and sorbet from low-FODMAP fruits and sweeteners.  (These are often labeled 12.1  © 6213-1142 Healthwise, Incorporated. Care instructions adapted under license by [facility] ([facility]). If you have questions about a medical condition or this instruction, always ask your healthcare professional. [address] any warranty or liability for your use of this information.

## 2019-09-18 NOTE — PROGRESS NOTES
09/17/2019 10  6 - 20 mg/dL Final    CREATININE 09/17/2019 0.66  0.50 - 0.90 mg/dL Final    Bun/Cre Ratio 09/17/2019 15  9 - 20 Final    Calcium 09/17/2019 8.9  8.6 - 10.4 mg/dL Final    Sodium 09/17/2019 140  135 - 144 mmol/L Final    Potassium 09/17/2019 3.9  3.7 - 5.3 mmol/L Final    Chloride 09/17/2019 101  98 - 107 mmol/L Final    CO2 09/17/2019 28  20 - 31 mmol/L Final    Anion Gap 09/17/2019 11  9 - 17 mmol/L Final    Alkaline Phosphatase 09/17/2019 94  35 - 104 U/L Final    ALT 09/17/2019 32  5 - 33 U/L Final    AST 09/17/2019 27  <32 U/L Final    Total Bilirubin 09/17/2019 0.40  0.3 - 1.2 mg/dL Final    Total Protein 09/17/2019 7.3  6.4 - 8.3 g/dL Final    Alb 09/17/2019 4.2  3.5 - 5.2 g/dL Final    Albumin/Globulin Ratio 09/17/2019 1.4  1.0 - 2.5 Final    GFR Non- 09/17/2019 >60  >60 mL/min Final    GFR  09/17/2019 >60  >60 mL/min Final    GFR Comment 09/17/2019        Final    Comment: Average GFR for 52-63 years old:   80 mL/min/1.73sq m  Chronic Kidney Disease:   <60 mL/min/1.73sq m  Kidney failure:   <15 mL/min/1.73sq m              eGFR calculated using average adult body mass. Additional eGFR calculator available at:        [email or web address].br            GFR Staging 09/17/2019 NOT REPORTED   Final         Assessment and Plan:    1. Type 2 diabetes mellitus without complication, without long-term current use of insulin (HCC)  Her HgbA1c was 5.9 %, which is excellent. She was advised to continue her current regimen. Metformin was prescribed:   - metFORMIN (GLUCOPHAGE-XR) 500 MG extended release tablet; Take 1 tablet by mouth daily (with breakfast)  Dispense: 90 tablet;  Refill: 1    She was also referred for diabetes education:  - [address] [place] [address] [place], NP, Diabetic Education, [place]    She was referred for an annual diabetic eye exam:   - [address], [address], Optometry, [place] were ordered to be done prior to her return visit in 3 months.   - Hemoglobin A1C; Future  - Comprehensive Metabolic Panel; Future    2. Mixed hyperlipidemia  Her LDL was near goal, but her triglycerides were elevated on her recent lab work. She was advised to continue Lipitor 80 mg daily. She was advised to decrease processed foods, especially processed carbohydrates in her diet. 3. Insomnia  She states that she does not need a refill of Ambien today. Non-pharmacologic methods to improve sleep were discussed. 4.  Rash  Contact dermatitis vs. other  She was advised to use Trimacinolone cream as needed. She was advised to follow up if symptoms worsen or do not resolve. 5. Interstitial lung disease ([facility] [address]  Her symptoms have been stable. She was given a prescription for a handicap placard:  - Handicap Placard MISC; by Does not apply route  Dispense: 1 each; Refill: 0    6. Diarrhea  Her last colonoscopy was in 2016 and was consistent with ischemic colitis. I have recommended that she try a low FODMAP diet. If her symptoms continue, she will be referred to general surgery or gastroenterology. Printed information regarding Learning About the Low FODMAP Diet for Irritable Bowel Syndrome (IBS) was provided to the patient with her after visit summary. 7.  Routine health maintenance  Health maintenance was reviewed with the patient. Annual influenza vaccine was recommended. 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 HIV screening was recommended and declined. Pneumovax was recommended and ordered. Shingrix was recommended and declined. All other health maintenance, including Tdap and Prevnar-13, is up to date at this time.        (Please note that portions of this note were completed with a voice-recognition program. Efforts were made to edit the dictation but occasionally words are mis-transcribed.)

## 2019-10-15 ENCOUNTER — TELEPHONE (OUTPATIENT)
Dept: FAMILY MEDICINE CLINIC | Age: 54
End: 2019-10-15

## 2019-10-28 ENCOUNTER — OFFICE VISIT (OUTPATIENT)
Dept: OPTOMETRY | Age: 54
End: 2019-10-28
Payer: COMMERCIAL

## 2019-10-28 DIAGNOSIS — H52.4 MYOPIA OF BOTH EYES WITH ASTIGMATISM AND PRESBYOPIA: Primary | ICD-10-CM

## 2019-10-28 DIAGNOSIS — H52.13 MYOPIA OF BOTH EYES WITH ASTIGMATISM AND PRESBYOPIA: Primary | ICD-10-CM

## 2019-10-28 DIAGNOSIS — H52.203 MYOPIA OF BOTH EYES WITH ASTIGMATISM AND PRESBYOPIA: Primary | ICD-10-CM

## 2019-10-28 PROCEDURE — 92250 FUNDUS PHOTOGRAPHY W/I&R: CPT | Performed by: OPTOMETRIST

## 2019-10-28 PROCEDURE — 92014 COMPRE OPH EXAM EST PT 1/>: CPT | Performed by: OPTOMETRIST

## 2019-10-28 ASSESSMENT — TONOMETRY
OD_IOP_MMHG: 16
IOP_METHOD: NON-CONTACT AIR PUFF
OS_IOP_MMHG: 15

## 2019-10-28 ASSESSMENT — SLIT LAMP EXAM - LIDS
COMMENTS: NORMAL
COMMENTS: NORMAL

## 2019-10-28 ASSESSMENT — VISUAL ACUITY
OS_CC: 20/30
METHOD: SNELLEN - LINEAR
OD_SC: 20/20 OU
OD_CC+: -2
CORRECTION_TYPE: GLASSES

## 2019-10-28 ASSESSMENT — REFRACTION_WEARINGRX
OS_AXIS: 022
OD_AXIS: 157
OD_CYLINDER: -1.00
OD_SPHERE: -4.50
OS_ADD: +2.25
OS_CYLINDER: -0.50
OD_ADD: +2.25
SPECS_TYPE: PAL
OS_SPHERE: -4.25

## 2019-10-28 ASSESSMENT — REFRACTION_MANIFEST
OS_CYLINDER: -0.75
OD_SPHERE: -4.50
OS_AXIS: 002
OD_AXIS: 157
OD_ADD: +2.25
OD_CYLINDER: -1.00
OS_ADD: +2.25
OS_SPHERE: -4.25

## 2019-11-25 DIAGNOSIS — F51.01 PRIMARY INSOMNIA: ICD-10-CM

## 2019-11-26 RX ORDER — ZOLPIDEM TARTRATE 5 MG/1
5 TABLET ORAL NIGHTLY PRN
Qty: 90 TABLET | Refills: 0 | Status: SHIPPED | OUTPATIENT
Start: 2019-11-26 | End: 2020-01-01 | Stop reason: SDUPTHER

## 2020-01-01 ENCOUNTER — TELEPHONE (OUTPATIENT)
Dept: FAMILY MEDICINE CLINIC | Age: 55
End: 2020-01-01

## 2020-01-01 ENCOUNTER — HOSPITAL ENCOUNTER (OUTPATIENT)
Age: 55
Setting detail: OUTPATIENT SURGERY
Discharge: HOME | End: 2020-02-20
Attending: SURGERY | Admitting: SURGERY
Payer: MEDICARE

## 2020-01-01 ENCOUNTER — ANESTHESIA (OUTPATIENT)
Dept: OPERATING ROOM | Age: 55
End: 2020-01-01
Payer: MEDICARE

## 2020-01-01 ENCOUNTER — OFFICE VISIT (OUTPATIENT)
Dept: FAMILY MEDICINE CLINIC | Age: 55
End: 2020-01-01
Payer: MEDICARE

## 2020-01-01 ENCOUNTER — HOSPITAL ENCOUNTER (OUTPATIENT)
Dept: LAB | Age: 55
Discharge: HOME | End: 2020-05-26
Payer: MEDICARE

## 2020-01-01 ENCOUNTER — OFFICE VISIT (OUTPATIENT)
Dept: DIABETES SERVICES | Age: 55
End: 2020-01-01
Payer: MEDICARE

## 2020-01-01 ENCOUNTER — TELEPHONE (OUTPATIENT)
Dept: SLEEP CENTER | Age: 55
End: 2020-01-01

## 2020-01-01 ENCOUNTER — OFFICE VISIT (OUTPATIENT)
Dept: PRIMARY CARE CLINIC | Age: 55
End: 2020-01-01
Payer: MEDICARE

## 2020-01-01 ENCOUNTER — HOSPITAL ENCOUNTER (OUTPATIENT)
Dept: MAMMOGRAPHY | Age: 55
Discharge: HOME | End: 2020-08-24
Payer: MEDICARE

## 2020-01-01 ENCOUNTER — TELEMEDICINE (OUTPATIENT)
Dept: FAMILY MEDICINE CLINIC | Age: 55
End: 2020-01-01
Payer: MEDICARE

## 2020-01-01 ENCOUNTER — ANESTHESIA EVENT (OUTPATIENT)
Dept: OPERATING ROOM | Age: 55
End: 2020-01-01
Payer: MEDICARE

## 2020-01-01 ENCOUNTER — TELEPHONE (OUTPATIENT)
Dept: PULMONOLOGY | Age: 55
End: 2020-01-01

## 2020-01-01 ENCOUNTER — TELEPHONE (OUTPATIENT)
Dept: SURGERY | Age: 55
End: 2020-01-01

## 2020-01-01 ENCOUNTER — OFFICE VISIT (OUTPATIENT)
Dept: PULMONOLOGY | Age: 55
End: 2020-01-01
Payer: MEDICARE

## 2020-01-01 ENCOUNTER — HOSPITAL ENCOUNTER (OUTPATIENT)
Dept: LAB | Age: 55
Discharge: HOME | End: 2020-10-08
Payer: MEDICARE

## 2020-01-01 VITALS
TEMPERATURE: 97.5 F | DIASTOLIC BLOOD PRESSURE: 100 MMHG | HEIGHT: 66 IN | HEART RATE: 100 BPM | SYSTOLIC BLOOD PRESSURE: 136 MMHG | WEIGHT: 226.2 LBS | BODY MASS INDEX: 36.35 KG/M2 | OXYGEN SATURATION: 96 %

## 2020-01-01 VITALS
DIASTOLIC BLOOD PRESSURE: 84 MMHG | HEART RATE: 86 BPM | WEIGHT: 224.1 LBS | BODY MASS INDEX: 36.02 KG/M2 | HEIGHT: 66 IN | SYSTOLIC BLOOD PRESSURE: 112 MMHG | RESPIRATION RATE: 16 BRPM

## 2020-01-01 VITALS — BODY MASS INDEX: 35.61 KG/M2 | WEIGHT: 214 LBS

## 2020-01-01 VITALS
HEART RATE: 88 BPM | SYSTOLIC BLOOD PRESSURE: 139 MMHG | BODY MASS INDEX: 36.65 KG/M2 | OXYGEN SATURATION: 96 % | DIASTOLIC BLOOD PRESSURE: 95 MMHG | HEIGHT: 65 IN | WEIGHT: 220 LBS | RESPIRATION RATE: 16 BRPM | TEMPERATURE: 98 F

## 2020-01-01 VITALS
OXYGEN SATURATION: 98 % | BODY MASS INDEX: 36.32 KG/M2 | HEART RATE: 112 BPM | DIASTOLIC BLOOD PRESSURE: 88 MMHG | WEIGHT: 225 LBS | SYSTOLIC BLOOD PRESSURE: 138 MMHG | TEMPERATURE: 98.2 F

## 2020-01-01 VITALS
BODY MASS INDEX: 36.94 KG/M2 | SYSTOLIC BLOOD PRESSURE: 128 MMHG | HEART RATE: 93 BPM | OXYGEN SATURATION: 94 % | DIASTOLIC BLOOD PRESSURE: 90 MMHG | RESPIRATION RATE: 16 BRPM | WEIGHT: 222 LBS

## 2020-01-01 VITALS
DIASTOLIC BLOOD PRESSURE: 62 MMHG | OXYGEN SATURATION: 95 % | SYSTOLIC BLOOD PRESSURE: 120 MMHG | RESPIRATION RATE: 16 BRPM | BODY MASS INDEX: 37.11 KG/M2 | HEART RATE: 99 BPM | WEIGHT: 223 LBS

## 2020-01-01 VITALS — OXYGEN SATURATION: 96 % | SYSTOLIC BLOOD PRESSURE: 133 MMHG | DIASTOLIC BLOOD PRESSURE: 74 MMHG

## 2020-01-01 LAB
ALBUMIN SERPL-MCNC: 4.1 G/DL (ref 3.5–5.2)
ALBUMIN SERPL-MCNC: 4.3 G/DL (ref 3.5–5.2)
ALBUMIN/GLOBULIN RATIO: 1.2 (ref 1–2.5)
ALBUMIN/GLOBULIN RATIO: 1.4 (ref 1–2.5)
ALP BLD-CCNC: 95 U/L (ref 35–104)
ALP BLD-CCNC: 99 U/L (ref 35–104)
ALT SERPL-CCNC: 29 U/L (ref 5–33)
ALT SERPL-CCNC: 30 U/L (ref 5–33)
ANION GAP SERPL CALCULATED.3IONS-SCNC: 11 MMOL/L (ref 9–17)
ANION GAP SERPL CALCULATED.3IONS-SCNC: 12 MMOL/L (ref 9–17)
AST SERPL-CCNC: 26 U/L
AST SERPL-CCNC: 29 U/L
BILIRUB SERPL-MCNC: 0.42 MG/DL (ref 0.3–1.2)
BILIRUB SERPL-MCNC: 0.43 MG/DL (ref 0.3–1.2)
BUN BLDV-MCNC: 14 MG/DL (ref 6–20)
BUN BLDV-MCNC: 16 MG/DL (ref 6–20)
BUN/CREAT BLD: 25 (ref 9–20)
BUN/CREAT BLD: 26 (ref 9–20)
CALCIUM SERPL-MCNC: 9.1 MG/DL (ref 8.6–10.4)
CALCIUM SERPL-MCNC: 9.7 MG/DL (ref 8.6–10.4)
CHLORIDE BLD-SCNC: 100 MMOL/L (ref 98–107)
CHLORIDE BLD-SCNC: 102 MMOL/L (ref 98–107)
CHOLESTEROL/HDL RATIO: 4.2
CHOLESTEROL: 169 MG/DL
CO2: 27 MMOL/L (ref 20–31)
CO2: 28 MMOL/L (ref 20–31)
CREAT SERPL-MCNC: 0.53 MG/DL (ref 0.5–0.9)
CREAT SERPL-MCNC: 0.64 MG/DL (ref 0.5–0.9)
CREATININE URINE: 166 MG/DL (ref 28–217)
ESTIMATED AVERAGE GLUCOSE: 114 MG/DL
ESTIMATED AVERAGE GLUCOSE: 117 MG/DL
GFR AFRICAN AMERICAN: >60 ML/MIN
GFR AFRICAN AMERICAN: >60 ML/MIN
GFR NON-AFRICAN AMERICAN: >60 ML/MIN
GFR NON-AFRICAN AMERICAN: >60 ML/MIN
GFR SERPL CREATININE-BSD FRML MDRD: ABNORMAL ML/MIN/{1.73_M2}
GLUCOSE BLD-MCNC: 129 MG/DL (ref 70–99)
GLUCOSE BLD-MCNC: 129 MG/DL (ref 70–99)
HBA1C MFR BLD: 5.6 % (ref 4.8–5.9)
HBA1C MFR BLD: 5.7 % (ref 4.8–5.9)
HDLC SERPL-MCNC: 40 MG/DL
LDL CHOLESTEROL: 65 MG/DL (ref 0–130)
MICROALBUMIN/CREAT 24H UR: <12 MG/L
MICROALBUMIN/CREAT UR-RTO: NORMAL MCG/MG CREAT
POTASSIUM SERPL-SCNC: 4.1 MMOL/L (ref 3.7–5.3)
POTASSIUM SERPL-SCNC: 4.2 MMOL/L (ref 3.7–5.3)
SODIUM BLD-SCNC: 139 MMOL/L (ref 135–144)
SODIUM BLD-SCNC: 141 MMOL/L (ref 135–144)
SURGICAL PATHOLOGY REPORT: NORMAL
TOTAL PROTEIN: 7.3 G/DL (ref 6.4–8.3)
TOTAL PROTEIN: 7.4 G/DL (ref 6.4–8.3)
TRIGL SERPL-MCNC: 318 MG/DL
VLDLC SERPL CALC-MCNC: ABNORMAL MG/DL (ref 1–30)

## 2020-01-01 PROCEDURE — 99205 OFFICE O/P NEW HI 60 MIN: CPT | Performed by: NURSE PRACTITIONER

## 2020-01-01 PROCEDURE — 6360000002 HC RX W HCPCS: Performed by: NURSE ANESTHETIST, CERTIFIED REGISTERED

## 2020-01-01 PROCEDURE — 90686 IIV4 VACC NO PRSV 0.5 ML IM: CPT | Performed by: FAMILY MEDICINE

## 2020-01-01 PROCEDURE — 99213 OFFICE O/P EST LOW 20 MIN: CPT | Performed by: FAMILY MEDICINE

## 2020-01-01 PROCEDURE — 80053 COMPREHEN METABOLIC PANEL: CPT

## 2020-01-01 PROCEDURE — 99212 OFFICE O/P EST SF 10 MIN: CPT

## 2020-01-01 PROCEDURE — 82570 ASSAY OF URINE CREATININE: CPT

## 2020-01-01 PROCEDURE — 2500000003 HC RX 250 WO HCPCS: Performed by: NURSE ANESTHETIST, CERTIFIED REGISTERED

## 2020-01-01 PROCEDURE — 83036 HEMOGLOBIN GLYCOSYLATED A1C: CPT

## 2020-01-01 PROCEDURE — 99214 OFFICE O/P EST MOD 30 MIN: CPT | Performed by: FAMILY MEDICINE

## 2020-01-01 PROCEDURE — 2709999900 HC NON-CHARGEABLE SUPPLY: Performed by: SURGERY

## 2020-01-01 PROCEDURE — 7100000011 HC PHASE II RECOVERY - ADDTL 15 MIN: Performed by: SURGERY

## 2020-01-01 PROCEDURE — 88305 TISSUE EXAM BY PATHOLOGIST: CPT

## 2020-01-01 PROCEDURE — 36415 COLL VENOUS BLD VENIPUNCTURE: CPT

## 2020-01-01 PROCEDURE — 3700000000 HC ANESTHESIA ATTENDED CARE: Performed by: SURGERY

## 2020-01-01 PROCEDURE — 99214 OFFICE O/P EST MOD 30 MIN: CPT | Performed by: NURSE PRACTITIONER

## 2020-01-01 PROCEDURE — 2580000003 HC RX 258: Performed by: NURSE ANESTHETIST, CERTIFIED REGISTERED

## 2020-01-01 PROCEDURE — 77063 BREAST TOMOSYNTHESIS BI: CPT

## 2020-01-01 PROCEDURE — 2580000003 HC RX 258: Performed by: SURGERY

## 2020-01-01 PROCEDURE — 82043 UR ALBUMIN QUANTITATIVE: CPT

## 2020-01-01 PROCEDURE — 3700000001 HC ADD 15 MINUTES (ANESTHESIA): Performed by: SURGERY

## 2020-01-01 PROCEDURE — 99213 OFFICE O/P EST LOW 20 MIN: CPT

## 2020-01-01 PROCEDURE — 99213 OFFICE O/P EST LOW 20 MIN: CPT | Performed by: NURSE PRACTITIONER

## 2020-01-01 PROCEDURE — 99214 OFFICE O/P EST MOD 30 MIN: CPT

## 2020-01-01 PROCEDURE — 3609010300 HC COLONOSCOPY W/BIOPSY SINGLE/MULTIPLE: Performed by: SURGERY

## 2020-01-01 PROCEDURE — 7100000010 HC PHASE II RECOVERY - FIRST 15 MIN: Performed by: SURGERY

## 2020-01-01 PROCEDURE — 80061 LIPID PANEL: CPT

## 2020-01-01 PROCEDURE — 45380 COLONOSCOPY AND BIOPSY: CPT | Performed by: SURGERY

## 2020-01-01 RX ORDER — MONTELUKAST SODIUM 10 MG/1
10 TABLET ORAL NIGHTLY
Qty: 90 TABLET | Refills: 3 | Status: SHIPPED | OUTPATIENT
Start: 2020-01-01

## 2020-01-01 RX ORDER — ZOLPIDEM TARTRATE 5 MG/1
5 TABLET ORAL NIGHTLY PRN
Status: CANCELLED | OUTPATIENT
Start: 2020-01-01 | End: 2020-01-01

## 2020-01-01 RX ORDER — PROPOFOL 10 MG/ML
INJECTION, EMULSION INTRAVENOUS PRN
Status: DISCONTINUED | OUTPATIENT
Start: 2020-01-01 | End: 2020-01-01 | Stop reason: SDUPTHER

## 2020-01-01 RX ORDER — MONTELUKAST SODIUM 10 MG/1
10 TABLET ORAL NIGHTLY
Qty: 90 TABLET | Refills: 1 | Status: SHIPPED | OUTPATIENT
Start: 2020-01-01 | End: 2020-01-01 | Stop reason: SDUPTHER

## 2020-01-01 RX ORDER — METFORMIN HYDROCHLORIDE 500 MG/1
500 TABLET, EXTENDED RELEASE ORAL
Qty: 90 TABLET | Refills: 1 | Status: SHIPPED | OUTPATIENT
Start: 2020-01-01 | End: 2020-01-01

## 2020-01-01 RX ORDER — PREDNISONE 20 MG/1
20 TABLET ORAL 2 TIMES DAILY
Qty: 6 TABLET | Refills: 0 | Status: SHIPPED | OUTPATIENT
Start: 2020-01-01 | End: 2020-01-01

## 2020-01-01 RX ORDER — BENZONATATE 100 MG/1
100-200 CAPSULE ORAL 3 TIMES DAILY PRN
Qty: 60 CAPSULE | Refills: 0 | Status: SHIPPED | OUTPATIENT
Start: 2020-01-01 | End: 2020-01-01

## 2020-01-01 RX ORDER — ZOLPIDEM TARTRATE 5 MG/1
5 TABLET ORAL NIGHTLY PRN
Qty: 90 TABLET | Refills: 0 | Status: SHIPPED | OUTPATIENT
Start: 2020-01-01 | End: 2020-01-01

## 2020-01-01 RX ORDER — MONTELUKAST SODIUM 10 MG/1
10 TABLET ORAL NIGHTLY
Qty: 90 TABLET | Refills: 3 | Status: SHIPPED | OUTPATIENT
Start: 2020-01-01 | End: 2020-01-01 | Stop reason: SDUPTHER

## 2020-01-01 RX ORDER — ZOLPIDEM TARTRATE 5 MG/1
5 TABLET ORAL NIGHTLY PRN
COMMUNITY
End: 2020-01-01 | Stop reason: SDUPTHER

## 2020-01-01 RX ORDER — ATORVASTATIN CALCIUM 80 MG/1
80 TABLET, FILM COATED ORAL DAILY
Qty: 90 TABLET | Refills: 1 | Status: SHIPPED | OUTPATIENT
Start: 2020-01-01 | End: 2020-01-01

## 2020-01-01 RX ORDER — AZITHROMYCIN 250 MG/1
250 TABLET, FILM COATED ORAL SEE ADMIN INSTRUCTIONS
Qty: 6 TABLET | Refills: 0 | Status: SHIPPED | OUTPATIENT
Start: 2020-01-01 | End: 2020-01-01

## 2020-01-01 RX ORDER — ZOLPIDEM TARTRATE 5 MG/1
2.5-5 TABLET ORAL NIGHTLY PRN
Qty: 90 TABLET | Refills: 0 | Status: SHIPPED | OUTPATIENT
Start: 2020-01-01 | End: 2020-01-01 | Stop reason: SDUPTHER

## 2020-01-01 RX ORDER — ZOLPIDEM TARTRATE 5 MG/1
2.5-5 TABLET ORAL NIGHTLY PRN
Qty: 90 TABLET | Refills: 0 | Status: SHIPPED | OUTPATIENT
Start: 2020-01-01 | End: 2021-01-19

## 2020-01-01 RX ORDER — ATORVASTATIN CALCIUM 80 MG/1
TABLET, FILM COATED ORAL
Qty: 90 TABLET | Refills: 0 | Status: SHIPPED | OUTPATIENT
Start: 2020-01-01

## 2020-01-01 RX ORDER — VENLAFAXINE HYDROCHLORIDE 75 MG/1
150 CAPSULE, EXTENDED RELEASE ORAL DAILY
Qty: 180 CAPSULE | Refills: 1 | Status: SHIPPED | OUTPATIENT
Start: 2020-01-01 | End: 2020-01-01 | Stop reason: SDUPTHER

## 2020-01-01 RX ORDER — SODIUM CHLORIDE 0.9 % (FLUSH) 0.9 %
10 SYRINGE (ML) INJECTION PRN
Status: DISCONTINUED | OUTPATIENT
Start: 2020-01-01 | End: 2020-01-01 | Stop reason: HOSPADM

## 2020-01-01 RX ORDER — LIDOCAINE HYDROCHLORIDE 40 MG/ML
INJECTION, SOLUTION RETROBULBAR; TOPICAL PRN
Status: DISCONTINUED | OUTPATIENT
Start: 2020-01-01 | End: 2020-01-01 | Stop reason: SDUPTHER

## 2020-01-01 RX ORDER — FLUTICASONE FUROATE 200 UG/1
1 POWDER RESPIRATORY (INHALATION) DAILY
Qty: 3 EACH | Refills: 3 | Status: SHIPPED | OUTPATIENT
Start: 2020-01-01 | End: 2021-10-14

## 2020-01-01 RX ORDER — FLUTICASONE FUROATE 200 UG/1
1 POWDER RESPIRATORY (INHALATION) DAILY
Qty: 30 EACH | Refills: 11 | Status: SHIPPED | OUTPATIENT
Start: 2020-01-01 | End: 2020-01-01 | Stop reason: SDUPTHER

## 2020-01-01 RX ORDER — SODIUM CHLORIDE, SODIUM LACTATE, POTASSIUM CHLORIDE, CALCIUM CHLORIDE 600; 310; 30; 20 MG/100ML; MG/100ML; MG/100ML; MG/100ML
INJECTION, SOLUTION INTRAVENOUS CONTINUOUS
Status: DISCONTINUED | OUTPATIENT
Start: 2020-01-01 | End: 2020-01-01 | Stop reason: HOSPADM

## 2020-01-01 RX ORDER — SODIUM CHLORIDE 0.9 % (FLUSH) 0.9 %
10 SYRINGE (ML) INJECTION EVERY 12 HOURS SCHEDULED
Status: DISCONTINUED | OUTPATIENT
Start: 2020-01-01 | End: 2020-01-01 | Stop reason: HOSPADM

## 2020-01-01 RX ORDER — VENLAFAXINE HYDROCHLORIDE 75 MG/1
150 CAPSULE, EXTENDED RELEASE ORAL DAILY
Qty: 180 CAPSULE | Refills: 1 | Status: SHIPPED | OUTPATIENT
Start: 2020-01-01

## 2020-01-01 RX ORDER — SODIUM CHLORIDE, SODIUM LACTATE, POTASSIUM CHLORIDE, CALCIUM CHLORIDE 600; 310; 30; 20 MG/100ML; MG/100ML; MG/100ML; MG/100ML
INJECTION, SOLUTION INTRAVENOUS CONTINUOUS PRN
Status: DISCONTINUED | OUTPATIENT
Start: 2020-01-01 | End: 2020-01-01 | Stop reason: SDUPTHER

## 2020-01-01 RX ADMIN — SODIUM CHLORIDE, POTASSIUM CHLORIDE, SODIUM LACTATE AND CALCIUM CHLORIDE: 600; 310; 30; 20 INJECTION, SOLUTION INTRAVENOUS at 07:59

## 2020-01-01 RX ADMIN — PROPOFOL 400 MG: 10 INJECTION, EMULSION INTRAVENOUS at 08:03

## 2020-01-01 RX ADMIN — LIDOCAINE HYDROCHLORIDE 100 MG: 40 INJECTION, SOLUTION RETROBULBAR; TOPICAL at 08:03

## 2020-01-01 RX ADMIN — SODIUM CHLORIDE, POTASSIUM CHLORIDE, SODIUM LACTATE AND CALCIUM CHLORIDE: 600; 310; 30; 20 INJECTION, SOLUTION INTRAVENOUS at 07:41

## 2020-01-01 ASSESSMENT — ENCOUNTER SYMPTOMS
ABDOMINAL PAIN: 0
RHINORRHEA: 0
COUGH: 0
SINUS PAIN: 0
CHEST TIGHTNESS: 0
DIARRHEA: 0
NAUSEA: 0
SHORTNESS OF BREATH: 0
VOMITING: 0
ABDOMINAL PAIN: 0
SINUS PRESSURE: 0
WHEEZING: 0
SHORTNESS OF BREATH: 0
CONSTIPATION: 0
DIARRHEA: 0
STRIDOR: 0
RESPIRATORY NEGATIVE: 1
DIARRHEA: 0
RESPIRATORY NEGATIVE: 1

## 2020-01-01 ASSESSMENT — PAIN SCALES - GENERAL
PAINLEVEL_OUTOF10: 0

## 2020-01-01 ASSESSMENT — PAIN - FUNCTIONAL ASSESSMENT: PAIN_FUNCTIONAL_ASSESSMENT: 0-10

## 2020-01-01 ASSESSMENT — PATIENT HEALTH QUESTIONNAIRE - PHQ9
2. FEELING DOWN, DEPRESSED OR HOPELESS: 0
SUM OF ALL RESPONSES TO PHQ QUESTIONS 1-9: 0
SUM OF ALL RESPONSES TO PHQ9 QUESTIONS 1 & 2: 0
1. LITTLE INTEREST OR PLEASURE IN DOING THINGS: 1
SUM OF ALL RESPONSES TO PHQ9 QUESTIONS 1 & 2: 2
2. FEELING DOWN, DEPRESSED OR HOPELESS: 1
1. LITTLE INTEREST OR PLEASURE IN DOING THINGS: 0
SUM OF ALL RESPONSES TO PHQ QUESTIONS 1-9: 2
SUM OF ALL RESPONSES TO PHQ QUESTIONS 1-9: 0
SUM OF ALL RESPONSES TO PHQ QUESTIONS 1-9: 2

## 2020-01-07 ENCOUNTER — OFFICE VISIT (OUTPATIENT)
Dept: SURGERY | Age: 55
End: 2020-01-07
Payer: MEDICARE

## 2020-01-07 VITALS
TEMPERATURE: 98.5 F | HEIGHT: 66 IN | BODY MASS INDEX: 36.16 KG/M2 | DIASTOLIC BLOOD PRESSURE: 87 MMHG | HEART RATE: 110 BPM | SYSTOLIC BLOOD PRESSURE: 116 MMHG | WEIGHT: 225 LBS

## 2020-01-07 PROCEDURE — 99203 OFFICE O/P NEW LOW 30 MIN: CPT | Performed by: SURGERY

## 2020-01-07 NOTE — PROGRESS NOTES
Subjective   Esteban Jean Baptiste is a 49 y.o. female who presents today for valuation of chronic ongoing diarrhea. Patient reports for approximately the past 2 to 2-1/2 months she has been having ongoing diarrhea. She states that this does not happen every day but that she does notice that several times a week she will have multiple loose bowel movements during the day. This is intermittent with what she considers to be more normal bowel movements but he has not noticed any factors that affect the loose bowel movements. During this time she is denied any bleeding per rectum. She does report that she has had some mild lower abdominal pain for the past couple months. Patient does have history of Hodgkin's lymphoma which was diagnosed approximately 12 to 13 years ago. She underwent chemotherapy with poor response and was eventually referred to [facility] clinic for stem cell treatment and had good response and has been cancer free since that time. Since then approximately 3 years ago she had bouts of bloody bowel movements and was admitted to the hospital in [place] where she underwent a colonoscopy. Review of those records shows that biopsies were taken of an ulcerated area in the descending colon and the biopsy results actually showed signs consistent with acute ischemic colitis. Patient has no known vascular disease and did undergo a CTA of the abdomen that showed no significant vascular disease or causes of ischemic colitis. The patient was recently seen by her oncologist and due to the new symptoms was recommended to undergo colonoscopy to evaluate for causes of the diarrhea. Patient denies any fevers or chills. No chest pain or shortness of breath. She does have some chronic lung disease secondary to the treatment and the diagnosis of her lymphoma but these are well controlled with medications. Denies any significant weight loss. Has been tolerating diet.   She denies any changes to medications other than approximately 3 weeks ago she did begin metformin. On further questioning she states that the diarrhea began long before starting this medication and she did not see any significant changes when she started the metformin. No nausea or emesis. Past Medical History:   Diagnosis Date    Adrenal insufficiency (HCC)     Atypical chest pain     with normal cardiolyte study    Cough     History of postinfectious cough    Depression     DVT (deep venous thrombosis) (HCC)     associated with left subclavian infusion port    Dyspnea     multifactorial    Herpes zoster     history of     Hilar adenopathy     histologically demonstrated in noncaceating granulomas, significance is not clear    History of colitis     most likely infectious    History of Hodgkin's lymphoma     recurrent hodgkin lymphoma, right hilum, in remission    Hx of stem cell transplant ([address] 2006    engraftment syndrome    Hyperlipidemia     Insomnia     Interstitial lung disease ([address]     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 Rosa Maria Gómez.    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 drug ([address]     bilateral neuropathy in feet    Obesity     Obstructive sleep apnea on CPAP     compliant    Peripheral polyneuropathy     Post-thoracotomy pain syndrome     Reactive airway disease     Tachycardia, unspecified     Upper respiratory tract infection        Past Surgical History:   Procedure Laterality Date    APPENDECTOMY      BLADDER SUSPENSION  5/12/2010    mesh mid urethral suspension at the time of her hysterectomy    BRONCHOSCOPY  08/2007, 2/2007, 06/2006    CARDIAC CATHETERIZATION  3/2/2011    normal    COLONOSCOPY  3/7/2013    normal; repeat 10 years, done at  albuterol (PROAIR HFA) 108 (90 BASE) MCG/ACT inhaler Inhale 2 puffs into the lungs every 4 hours as needed for Wheezing 3 Inhaler 1    Handicap Placard MISC by Does not apply route 1 each 0    triamcinolone (ARISTOCORT) 0.5 % cream Apply topically 3 times daily. 1 Tube 0    estrogens, conjugated,-methyltestosterone (ESTRATEST HS) 0.625-1.25 MG per tablet Take 1 tablet by mouth daily. . (Patient not taking: Reported on 1/7/2020) 90 tablet 1    UNABLE TO FIND Massage therapy once a week as needed. Diagnosis low back pain. 1 each 0    Ascorbic Acid (VITAMIN C) 250 MG CHEW Take 1 tablet by mouth daily      ibuprofen (ADVIL;MOTRIN) 200 MG tablet Take 200 mg by mouth every 6 hours as needed for Pain      albuterol (PROVENTIL) (2.5 MG/3ML) 0.083% nebulizer solution Take 3 mLs by nebulization every 4 hours as needed for Wheezing or Shortness of Breath 120 each 0     No current facility-administered medications for this visit. Allergies   Allergen Reactions    Compazine [Prochlorperazine] Other (See Comments)     Felt shaky.  Tape Kenzie Bury Tape] Rash       Family History   Problem Relation Age of Onset    Coronary Art Dis Father     Heart Disease Father     Hypertension Mother     Cataracts Mother     Glaucoma Neg Hx     Cancer Neg Hx     Diabetes Neg Hx        Social History     Socioeconomic History    Marital status:      Spouse name: Not on file    Number of children: Not on file    Years of education: Not on file    Highest education level: Not on file   Occupational History    Not on file   Social Needs    Financial resource strain: Not on file    Food insecurity:     Worry: Not on file     Inability: Not on file    Transportation needs:     Medical: Not on file     Non-medical: Not on file   Tobacco Use    Smoking status: Never Smoker    Smokeless tobacco: Never Used   Substance and Sexual Activity    Alcohol use: No     Alcohol/week: 0.0 standard drinks    Drug use:  No  Sexual activity: Not on file   Lifestyle    Physical activity:     Days per week: Not on file     Minutes per session: Not on file    Stress: Not on file   Relationships    Social connections:     Talks on phone: Not on file     Gets together: Not on file     Attends Taoism service: Not on file     Active member of club or organization: Not on file     Attends meetings of clubs or organizations: Not on file     Relationship status: Not on file    Intimate partner violence:     Fear of current or ex partner: Not on file     Emotionally abused: Not on file     Physically abused: Not on file     Forced sexual activity: Not on file   Other Topics Concern    Not on file   Social History Narrative    Not on file       ROS:   Review of Systems - General ROS: negative for - chills, fever, night sweats or weight loss  Psychological ROS: negative  Ophthalmic ROS: negative  ENT ROS: negative  Allergy and Immunology ROS: negative  Hematological and Lymphatic ROS: negative  Endocrine ROS: negative  Respiratory ROS: no cough, shortness of breath, or wheezing  Cardiovascular ROS: no chest pain or dyspnea on exertion  Gastrointestinal ROS: per HPI  Genito-Urinary ROS: no dysuria, trouble voiding, or hematuria  Musculoskeletal ROS: negative      Objective   Vitals:    01/07/20 1403   BP: 116/87   Pulse: 110   Temp: 98.5 °F (36.9 °C)     General:in no apparent distress, well developed and well nourished, alert and oriented times 3  Eyes: No gross abnormalities. Ears, Nose, Throat: hearing grossly normal bilaterally  Neck: neck supple and non tender without mass  Lungs: clear to auscultation without wheezes or rales   Heart: S1S2, no mumurs, RRR  Abdomen: Soft, nondistended, mild tenderness to palpation in the lower abdomen left greater than right, bowel sounds present.   Extremity: negative  Neuro: CN II-XII grossly intact      Assessment     3  50-year-old female with 2 to 2-1/2-month history of chronic nonbloody

## 2020-01-15 ENCOUNTER — HOSPITAL ENCOUNTER (OUTPATIENT)
Dept: LAB | Age: 55
Discharge: HOME | End: 2020-01-15
Payer: MEDICARE

## 2020-01-15 ENCOUNTER — OFFICE VISIT (OUTPATIENT)
Dept: FAMILY MEDICINE CLINIC | Age: 55
End: 2020-01-15
Payer: MEDICARE

## 2020-01-15 VITALS
RESPIRATION RATE: 16 BRPM | WEIGHT: 225.5 LBS | HEIGHT: 66 IN | SYSTOLIC BLOOD PRESSURE: 124 MMHG | DIASTOLIC BLOOD PRESSURE: 86 MMHG | BODY MASS INDEX: 36.24 KG/M2 | HEART RATE: 74 BPM

## 2020-01-15 LAB
ALBUMIN SERPL-MCNC: 4.2 G/DL (ref 3.5–5.2)
ALBUMIN/GLOBULIN RATIO: 1.2 (ref 1–2.5)
ALP BLD-CCNC: 85 U/L (ref 35–104)
ALT SERPL-CCNC: 28 U/L (ref 5–33)
ANION GAP SERPL CALCULATED.3IONS-SCNC: 12 MMOL/L (ref 9–17)
AST SERPL-CCNC: 24 U/L
BILIRUB SERPL-MCNC: 0.48 MG/DL (ref 0.3–1.2)
BUN BLDV-MCNC: 14 MG/DL (ref 6–20)
BUN/CREAT BLD: 19 (ref 9–20)
CALCIUM SERPL-MCNC: 9.6 MG/DL (ref 8.6–10.4)
CHLORIDE BLD-SCNC: 102 MMOL/L (ref 98–107)
CO2: 29 MMOL/L (ref 20–31)
CREAT SERPL-MCNC: 0.74 MG/DL (ref 0.5–0.9)
ESTIMATED AVERAGE GLUCOSE: 128 MG/DL
GFR AFRICAN AMERICAN: >60 ML/MIN
GFR NON-AFRICAN AMERICAN: >60 ML/MIN
GFR SERPL CREATININE-BSD FRML MDRD: ABNORMAL ML/MIN/{1.73_M2}
GFR SERPL CREATININE-BSD FRML MDRD: ABNORMAL ML/MIN/{1.73_M2}
GLUCOSE BLD-MCNC: 121 MG/DL (ref 70–99)
HBA1C MFR BLD: 6.1 % (ref 4.8–5.9)
POTASSIUM SERPL-SCNC: 4.2 MMOL/L (ref 3.7–5.3)
SODIUM BLD-SCNC: 143 MMOL/L (ref 135–144)
TOTAL PROTEIN: 7.7 G/DL (ref 6.4–8.3)

## 2020-01-15 PROCEDURE — 36415 COLL VENOUS BLD VENIPUNCTURE: CPT

## 2020-01-15 PROCEDURE — 80053 COMPREHEN METABOLIC PANEL: CPT

## 2020-01-15 PROCEDURE — 83036 HEMOGLOBIN GLYCOSYLATED A1C: CPT

## 2020-01-15 PROCEDURE — 99213 OFFICE O/P EST LOW 20 MIN: CPT | Performed by: FAMILY MEDICINE

## 2020-01-15 PROCEDURE — 99212 OFFICE O/P EST SF 10 MIN: CPT

## 2020-01-15 RX ORDER — ATORVASTATIN CALCIUM 80 MG/1
80 TABLET, FILM COATED ORAL DAILY
Qty: 90 TABLET | Refills: 1 | Status: SHIPPED | OUTPATIENT
Start: 2020-01-15 | End: 2020-01-01 | Stop reason: SDUPTHER

## 2020-01-15 RX ORDER — VENLAFAXINE HYDROCHLORIDE 75 MG/1
150 CAPSULE, EXTENDED RELEASE ORAL DAILY
Qty: 180 CAPSULE | Refills: 1 | Status: SHIPPED | OUTPATIENT
Start: 2020-01-15 | End: 2020-01-01 | Stop reason: SDUPTHER

## 2020-01-15 ASSESSMENT — PATIENT HEALTH QUESTIONNAIRE - PHQ9
1. LITTLE INTEREST OR PLEASURE IN DOING THINGS: 0
SUM OF ALL RESPONSES TO PHQ9 QUESTIONS 1 & 2: 0
SUM OF ALL RESPONSES TO PHQ QUESTIONS 1-9: 0
SUM OF ALL RESPONSES TO PHQ QUESTIONS 1-9: 0
2. FEELING DOWN, DEPRESSED OR HOPELESS: 0

## 2020-01-15 NOTE — PROGRESS NOTES
On waiting list for shingles vaccine at [facility]. Patient refused HIV testing.

## 2020-01-15 NOTE — PROGRESS NOTES
[address]                        Telephone [phone]             Fax [phone]     Dash Carrera  1965  MRN:  [id number]  Date of visit:  1/15/2020    Subjective:    Dash Carrera is a 49 y.o.  female who presents to [facility] today (1/15/2020) for follow up/evaluation of: Insomnia and Diabetes      She states that she has not been exercising regularly. She states that she is more active during the warmer months. She denies chest pain or shortness of breath with activity or at rest.  She has an appointment for follow up with pulmonology next month. She reports that she has discontinued Ambien. She reports that she continues to have some difficulty sleeping, but it is manageable. She is tolerating her medications well. She feels that Effexor is working well for her. She has no new concerns or complaints today. She has the following problem list:  Patient Active Problem List   Diagnosis    Interstitial lung disease ([address]    Depression    Reactive airway disease    Tachycardia    Obesity (BMI 30-39. 9)    Post-thoracotomy pain syndrome    Hx of stem cell transplant ([address]    Mixed hyperlipidemia    History of Hodgkin's lymphoma    History of DVT (deep vein thrombosis)    Primary insomnia    Neuropathy due to chemotherapeutic drug ([address]    High risk medication use    Myopia of both eyes with astigmatism    Type 2 diabetes mellitus without complication (HCC)    Rectal bleeding    Ischemic colitis ([address]    bilateral iliac vein compression    Bacteremia    Hodgkin's disease ([address]    Sarcoidosis    Thromboembolic disorder ([address]    History of autologous stem cell transplant ([address]    Pneumonitis    Mixed hyperlipidemia        Current medications are:  Outpatient Medications Marked as Taking for the 1/15/20 encounter distress. Neck supple. No adenopathy. Thyroid symmetric, normal size. Chest:  Normal expansion. Clear to auscultation. No rales, rhonchi, or wheezing. Heart sounds are normal.  Regular rate and rhythm without murmur, gallop or rub. Lower extremities have no edema. Affect is bright. Thought processes are logical. There is no evidence of paranoia or delusions. Responses to questions are appropriate. Dress and grooming are appropriate. Labs done 1/15/2020 were reviewed with the patient:   Hospital Outpatient Visit on 01/15/2020   Component Date Value Ref Range Status    Glucose 01/15/2020 121* 70 - 99 mg/dL Final    BUN 01/15/2020 14  6 - 20 mg/dL Final    CREATININE 01/15/2020 0.74  0.50 - 0.90 mg/dL Final    Bun/Cre Ratio 01/15/2020 19  9 - 20 Final    Calcium 01/15/2020 9.6  8.6 - 10.4 mg/dL Final    Sodium 01/15/2020 143  135 - 144 mmol/L Final    Potassium 01/15/2020 4.2  3.7 - 5.3 mmol/L Final    Chloride 01/15/2020 102  98 - 107 mmol/L Final    CO2 01/15/2020 29  20 - 31 mmol/L Final    Anion Gap 01/15/2020 12  9 - 17 mmol/L Final    Alkaline Phosphatase 01/15/2020 85  35 - 104 U/L Final    ALT 01/15/2020 28  5 - 33 U/L Final    AST 01/15/2020 24  <32 U/L Final    Total Bilirubin 01/15/2020 0.48  0.3 - 1.2 mg/dL Final    Total Protein 01/15/2020 7.7  6.4 - 8.3 g/dL Final    Alb 01/15/2020 4.2  3.5 - 5.2 g/dL Final    Albumin/Globulin Ratio 01/15/2020 1.2  1.0 - 2.5 Final    GFR Non- 01/15/2020 >60  >60 mL/min Final    GFR  01/15/2020 >60  >60 mL/min Final    GFR Comment 01/15/2020        Final    Comment: Average GFR for 52-63 years old:   80 mL/min/1.73sq m  Chronic Kidney Disease:   <60 mL/min/1.73sq m  Kidney failure:   <15 mL/min/1.73sq m              GFR is a calculated value that has proven clinically to  be a more effective measure of   kidney function when reported with serum creatinine.             GFR Staging 01/15/2020 NOT REPORTED were made to edit the dictation but occasionally words are mis-transcribed.)

## 2020-01-20 NOTE — TELEPHONE ENCOUNTER
I received a call from [facility]. She states patient had called today and cancelled her Colonoscopy that was scheduled today d/t patient developing respiratory symptoms over the weekend. Patient states that she will get in touch with her PCP to see if she will be well enough to have scope done Monday 1/27/19. Patient says that she is not running fever, but feels like her lungs are pretty wheezy. Patient says that if she cannot get in here by next Monday she will wait and call once she returns from vacation (Leaving 2/3/20).

## 2020-01-20 NOTE — PROGRESS NOTES
[facility]             [address] [place] [address], [address]                        Telephone [phone]             Fax [phone]       Jaime Flores  1965  MRN:  [id number]  Date of visit:  1/20/2020     Subjective:    Jaime Flores is a 49 y.o.  female who presents to [facility] today (1/20/2020) for evaluation of:  Cough (x4 days. sinus congestion. HA. )      She states that she has had sinus and chest congestion for the past 4 days. She states that that cough is productive at times. She denies fever. She has been using her inhalers, which has helped. She has not used any over the counter medications. She has a history of interstitial lung disease related to chemotherapy. Current medications are:  Current Outpatient Medications   Medication Sig Dispense Refill    venlafaxine (EFFEXOR XR) 75 MG extended release capsule Take 2 capsules by mouth daily 180 capsule 1    atorvastatin (LIPITOR) 80 MG tablet Take 1 tablet by mouth daily 90 tablet 1    bisacodyl (BISACODYL) 5 MG EC tablet Take 1 tablet by mouth See Admin Instructions 2 tablet 0    zolpidem (AMBIEN) 5 MG tablet Take 1 tablet by mouth nightly as needed for Sleep for up to 90 days. 90 tablet 0    Handicap Placard MISC by Does not apply route 1 each 0    metFORMIN (GLUCOPHAGE-XR) 500 MG extended release tablet Take 1 tablet by mouth daily (with breakfast) 90 tablet 1    mometasone-formoterol (DULERA) 200-5 MCG/ACT inhaler Inhale 2 puffs into the lungs every 12 hours 1 Inhaler 5    albuterol sulfate HFA (PROVENTIL HFA) 108 (90 Base) MCG/ACT inhaler Inhale 2 puffs into the lungs every 4 hours as needed for Wheezing 1 Inhaler 0    montelukast (SINGULAIR) 10 MG tablet Take 1 tablet by mouth nightly 30 tablet 11    UNABLE TO FIND Massage therapy once a week as needed. Diagnosis low back pain.  1 each 0    Ascorbic Acid (VITAMIN C) 250 MG CHEW Take 1 tablet by mouth daily      ibuprofen (ADVIL;MOTRIN) 200 MG tablet Take 200 mg by mouth every 6 hours as needed for Pain      albuterol (PROVENTIL) (2.5 MG/3ML) 0.083% nebulizer solution Take 3 mLs by nebulization every 4 hours as needed for Wheezing or Shortness of Breath 120 each 0    albuterol (PROAIR HFA) 108 (90 BASE) MCG/ACT inhaler Inhale 2 puffs into the lungs every 4 hours as needed for Wheezing 3 Inhaler 1     No current facility-administered medications for this visit. She is allergic to compazine [prochlorperazine] and tape [adhesive tape]. She has the following problem list:  Patient Active Problem List   Diagnosis    Interstitial lung disease ([facility]    Depression    Reactive airway disease    Tachycardia    Obesity (BMI 30-39. 9)    Post-thoracotomy pain syndrome    Hx of stem cell transplant ([facility]    Mixed hyperlipidemia    History of Hodgkin's lymphoma    History of DVT (deep vein thrombosis)    Primary insomnia    Neuropathy due to chemotherapeutic drug ([facility]    High risk medication use    Myopia of both eyes with astigmatism    Type 2 diabetes mellitus without complication (HCC)    Rectal bleeding    Ischemic colitis ([address]    bilateral iliac vein compression    Bacteremia    Hodgkin's disease ([address]    Sarcoidosis    Thromboembolic disorder ([address]    History of autologous stem cell transplant ([facility]    Pneumonitis    Mixed hyperlipidemia        She  reports that she has never smoked. She has never used smokeless tobacco.      Objective:    Vitals:    01/20/20 1636   BP: 138/88   Site: Left Upper Arm   Position: Sitting   Cuff Size: Large Adult   Pulse: 112   Temp: 98.2 °F (36.8 °C)   TempSrc: Tympanic   SpO2: 98%   Weight: 225 lb (102.1 kg)      SpO2: 98 %       Body mass index is 36.32 kg/m². Obese  female alert and cooperative. The tympanic membranes are clear bilaterally. Oropharynx has no erythema. There is no exudate.   There is no tenderness over the frontal and maxillary sinuses bilaterally. Neck supple. No adenopathy. Chest:  There are expiratory wheezes bilaterally. Respirations are not labored. Heart sounds are normal.  Regular rate and rhythm without murmur, gallop or rub. Assessment & Plan:    Bronchitis  - azithromycin (ZITHROMAX) 250 MG tablet; Take 1 tablet by mouth See Admin Instructions for 5 days 500mg on day 1 followed by 250mg on days 2 - 5  Dispense: 6 tablet; Refill: 0  - benzonatate (TESSALON) 100 MG capsule; Take 1-2 capsules by mouth 3 times daily as needed for Cough  Dispense: 60 capsule; Refill: 0  - predniSONE (DELTASONE) 20 MG tablet; Take 1 tablet by mouth 2 times daily for 5 days Take with food. Dispense: 6 tablet; Refill: 0    She was advised to follow up if symptoms worsen or do not resolve.        (Please note that portions of this note were completed with a voice-recognition program. Efforts were made to edit the dictation but occasionally words are mis-transcribed.)

## 2020-01-21 NOTE — TELEPHONE ENCOUNTER
Spoke to patient at clinic window this afternoon. Patient stated she would like to be scheduled in February due to her upcoming vacation in [place]. She is now scheduled in [place] for 02/20/20. Spoke to Jailene Wick.

## 2020-01-31 NOTE — TELEPHONE ENCOUNTER
Dr Villeda , patient is current but not scheduled for an appointment at this time. I sent a note to pharmacy asking that patient call for an appointment. Per last dictation patient is on this medication. Please sign for refill if ok. Thank you.

## 2020-02-19 NOTE — H&P
Subjective   Moise Carcamo is a 49 y.o. female who presents today for valuation of chronic ongoing diarrhea. Patient reports for approximately the past 2 to 2-1/2 months she has been having ongoing diarrhea. She states that this does not happen every day but that she does notice that several times a week she will have multiple loose bowel movements during the day. This is intermittent with what she considers to be more normal bowel movements but he has not noticed any factors that affect the loose bowel movements. During this time she is denied any bleeding per rectum. She does report that she has had some mild lower abdominal pain for the past couple months. Patient does have history of Hodgkin's lymphoma which was diagnosed approximately 12 to 13 years ago. She underwent chemotherapy with poor response and was eventually referred to [facility] clinic for stem cell treatment and had good response and has been cancer free since that time. Since then approximately 3 years ago she had bouts of bloody bowel movements and was admitted to the hospital in [place] where she underwent a colonoscopy. Review of those records shows that biopsies were taken of an ulcerated area in the descending colon and the biopsy results actually showed signs consistent with acute ischemic colitis. Patient has no known vascular disease and did undergo a CTA of the abdomen that showed no significant vascular disease or causes of ischemic colitis. The patient was recently seen by her oncologist and due to the new symptoms was recommended to undergo colonoscopy to evaluate for causes of the diarrhea. Patient denies any fevers or chills. No chest pain or shortness of breath. She does have some chronic lung disease secondary to the treatment and the diagnosis of her lymphoma but these are well controlled with medications. Denies any significant weight loss. Has been tolerating diet.   She denies any changes to medications other (90 Base) MCG/ACT inhaler Inhale 2 puffs into the lungs every 4 hours as needed for Wheezing 1 Inhaler 0    montelukast (SINGULAIR) 10 MG tablet Take 1 tablet by mouth nightly 30 tablet 11    albuterol (PROAIR HFA) 108 (90 BASE) MCG/ACT inhaler Inhale 2 puffs into the lungs every 4 hours as needed for Wheezing 3 Inhaler 1    Handicap Placard MISC by Does not apply route 1 each 0    triamcinolone (ARISTOCORT) 0.5 % cream Apply topically 3 times daily. 1 Tube 0    estrogens, conjugated,-methyltestosterone (ESTRATEST HS) 0.625-1.25 MG per tablet Take 1 tablet by mouth daily. . (Patient not taking: Reported on 1/7/2020) 90 tablet 1    UNABLE TO FIND Massage therapy once a week as needed. Diagnosis low back pain. 1 each 0    Ascorbic Acid (VITAMIN C) 250 MG CHEW Take 1 tablet by mouth daily        ibuprofen (ADVIL;MOTRIN) 200 MG tablet Take 200 mg by mouth every 6 hours as needed for Pain        albuterol (PROVENTIL) (2.5 MG/3ML) 0.083% nebulizer solution Take 3 mLs by nebulization every 4 hours as needed for Wheezing or Shortness of Breath 120 each 0      No current facility-administered medications for this visit.                   Allergies   Allergen Reactions    Compazine [Prochlorperazine] Other (See Comments)       Felt shaky.     Tape Don Knife Tape] Rash         Family History         Family History   Problem Relation Age of Onset    Coronary Art Dis Father      Heart Disease Father      Hypertension Mother      Cataracts Mother      Glaucoma Neg Hx      Cancer Neg Hx      Diabetes Neg Hx              Social History               Socioeconomic History    Marital status:        Spouse name: Not on file    Number of children: Not on file    Years of education: Not on file    Highest education level: Not on file   Occupational History    Not on file   Social Needs    Financial resource strain: Not on file    Food insecurity:       Worry: Not on file       Inability: Not on file   [facility]

## 2020-02-20 NOTE — ANESTHESIA POSTPROCEDURE EVALUATION
Department of Anesthesiology  Postprocedure Note    Patient: Tatiana Bermudez  MRN: [id number]  YOB: 1965  Date of evaluation: 2/20/2020  Time:  8:23 AM     Procedure Summary     Date:  02/20/20 Room / Location:  [address]    Anesthesia Start:  2694 Anesthesia Stop:  1117    Procedure:  COLONOSCOPY WITH BIOPSY (N/A ) Diagnosis:  (chronic diarhhea)    Surgeon:  Cindi Tran DO Responsible Provider:  JOSE Correa CRNA    Anesthesia Type:  general, TIVA ASA Status:  2          Anesthesia Type: general, TIVA    Srinivasan Phase I: Srinivasan Score: 10    Srinivasan Phase II:      Last vitals: Reviewed and per EMR flowsheets.        Anesthesia Post Evaluation    Patient location during evaluation: PACU  Patient participation: complete - patient participated  Level of consciousness: awake and alert  Pain score: 0  Airway patency: patent  Nausea & Vomiting: no nausea and no vomiting  Complications: no  Cardiovascular status: blood pressure returned to baseline and hemodynamically stable  Respiratory status: acceptable  Hydration status: euvolemic

## 2020-02-20 NOTE — OP NOTE
to the level of the cecum, which was identified by  appendiceal orifice and ileocecal valve. During advancement of scope,  the bowel prep was noted to be adequate. Terminal ileum was intubated  and appeared grossly normal.  The scope was then slowly withdrawn  through the colon with the withdrawal time being greater than 7 minutes. During this time, the colonic mucosa was carefully inspected for  abnormalities. In the mid ascending colon, a small polyp was  encountered which was removed with single bite of the forceps and sent  as specimen. After ensuring hemostasis, the scope was withdrawn through  the remainder of the colon with no other abnormalities noted. Once at  the rectum, a retroflex view was obtained which did show small internal  hemorrhoids but no other abnormalities. The scope was then straightened  and removed, and procedure was concluded. At the conclusion of the  procedure, the patient was in stable condition, was taken to the PACU  for recovery. PLAN:  I will follow up the results of the polyp that was removed today  and we will make final recommendations for repeat colonoscopy at that  time. Otherwise, the patient will be referred back to her PCP for  medical management of her diarrhea.         Darshan Damon    D: 02/20/2020 8:27:19       T: 02/20/2020 8:35:11     [email or web address]  Job#: [id number]     Doc#: [id number]    CC:  Patient's Primary Care       [facility] Tabitha

## 2020-02-20 NOTE — ANESTHESIA PRE PROCEDURE
Breath 1/27/16   Bhupinder Draft, APRN - CNP   albuterol [facility] HFA) 108 (90 BASE) MCG/ACT inhaler Inhale 2 puffs into the lungs every 4 hours as needed for Wheezing 8/5/15   Rocael Arshad MD       Current medications:    No current facility-administered medications for this encounter. Allergies: Allergies   Allergen Reactions    Compazine [Prochlorperazine] Other (See Comments)     Felt shaky.  Tape Kenzie Bury Tape] Rash       Problem List:    Patient Active Problem List   Diagnosis Code    Interstitial lung disease ([facility] J84.9    Depression F32.9    Reactive airway disease J45.909    Tachycardia R00.0    Obesity (BMI 30-39. 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    Hx of [facility] ([facility] Z94.84    Mixed hyperlipidemia E78.2    History of Hodgkin's lymphoma Z85.71    History of DVT (deep vein thrombosis) Z86.718    Primary insomnia F51.01    Neuropathy due to chemotherapeutic drug ([facility] G62.0, T45.1X5A    High risk medication use Z79.899    Myopia of both eyes with astigmatism H52.13, H52.203    Type 2 diabetes mellitus without complication (HCC) I27.6    Rectal bleeding K62.5    Ischemic colitis ([facility] K55.9    bilateral iliac vein compression I87.1    Bacteremia R78.81    Hodgkin's disease (HCC) C81.90    Sarcoidosis F72.3    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    History of autologous stem cell transplant ([facility] Z94.84    Pneumonitis J18.9    Mixed hyperlipidemia E78.2       Past Medical History:        Diagnosis Date    Adrenal insufficiency (HCC)     Atypical chest pain     with normal cardiolyte study    Cough     History of postinfectious cough    Depression     DVT (deep venous thrombosis) (HCC)     associated with left subclavian infusion port    Dyspnea     multifactorial    Herpes zoster     history of     Hilar adenopathy     histologically demonstrated in noncaceating granulomas, significance is not clear    History of colitis     most AND BSO  5/12/2010    laparoscopic assisted vaginal hysterectomy with bilateral salpingo-oopherectomy and anterior repair; mesh mid urethral suspension    THORACOSCOPY  07/24/2006    TUNNELED VENOUS PORT PLACEMENT  12-    INSERTION OF LEFT SUBCLAVIAN INFUSION PORT    UPPER GASTROINTESTINAL ENDOSCOPY  12/2004 & 3/8/2011    with esophageal dilation on 3/8/2011       Social History:    Social History     Tobacco Use    Smoking status: Never Smoker    Smokeless tobacco: Never Used   Substance Use Topics    Alcohol use: No     Alcohol/week: 0.0 standard drinks                                Counseling given: Not Answered      Vital Signs (Current): There were no vitals filed for this visit. BP Readings from Last 3 Encounters:   01/20/20 138/88   01/15/20 124/86   01/07/20 116/87       NPO Status:                                                                                 BMI:   Wt Readings from Last 3 Encounters:   01/20/20 225 lb (102.1 kg)   01/15/20 225 lb 8 oz (102.3 kg)   01/07/20 225 lb (102.1 kg)     There is no height or weight on file to calculate BMI.    CBC:   Lab Results   Component Value Date    WBC 9.0 05/07/2019    RBC 4.26 05/07/2019    HGB 13.2 05/07/2019    HCT 39.8 05/07/2019    MCV 93.4 05/07/2019    RDW 13.5 05/07/2019     05/07/2019       CMP:   Lab Results   Component Value Date     01/15/2020    K 4.2 01/15/2020     01/15/2020    CO2 29 01/15/2020    BUN 14 01/15/2020    CREATININE 0.74 01/15/2020    CREATININE 0.7 01/15/2019    GFRAA >60 01/15/2020    LABGLOM >60 01/15/2020    GLUCOSE 121 01/15/2020    PROT 7.7 01/15/2020    CALCIUM 9.6 01/15/2020    BILITOT 0.48 01/15/2020    ALKPHOS 85 01/15/2020    AST 24 01/15/2020    ALT 28 01/15/2020       POC Tests: No results for input(s): POCGLU, POCNA, POCK, POCCL, POCBUN, POCHEMO, POCHCT in the last 72 hours.     Coags:   Lab Results   Component Value Date    PROTIME 11.2

## 2020-02-27 NOTE — TELEPHONE ENCOUNTER
The patient called and stated that she can not afford the medication Dulera she is wondering if there is a cheaper inhaler she can use. Please advise       We do not have any samples in at this time, but I did inform her once we do I will contact her to come  some.

## 2020-03-04 NOTE — PROGRESS NOTES
delay progression of her diabetes. Current Diabetic Medications  Metformin 500 mg once daily     DKA episodes: 0      High cholesterol-  Takes Lipitor and denies any adverse effects with its use. Watches diet and exercise. Obesity- Working on weight loss. Past Medical History:   Diagnosis Date    Adrenal insufficiency (HCC)     Atypical chest pain     with normal cardiolyte study    Cough     History of postinfectious cough    Depression     DVT (deep venous thrombosis) (HCC)     associated with left subclavian infusion port    Dyspnea     multifactorial    Herpes zoster     history of     Hilar adenopathy     histologically demonstrated in noncaceating granulomas, significance is not clear    History of colitis     most likely infectious    History of Hodgkin's lymphoma     recurrent hodgkin lymphoma, right hilum, in remission    Hx of stem cell transplant ([address] 2006    engraftment syndrome    Hyperlipidemia     Insomnia     Interstitial lung disease ([address]     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 Rosas Parker.    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 drug ([address]     bilateral neuropathy in feet    Obesity     Obstructive sleep apnea on CPAP     compliant    Peripheral polyneuropathy     Post-thoracotomy pain syndrome     Reactive airway disease     Tachycardia, unspecified     Upper respiratory tract infection      Family History   Problem Relation Age of Onset    Coronary Art Dis Father     Heart Disease Father     Hypertension Mother     Cataracts Mother     Glaucoma Neg Hx     Cancer Neg Hx     Diabetes Neg Hx      Social History     Tobacco Use    Smoking status: Never Smoker    Smokeless tobacco: Never chest pain and leg swelling. Gastrointestinal: Negative for abdominal pain and diarrhea. Endocrine: Negative for polydipsia, polyphagia and polyuria. Genitourinary: Negative. Musculoskeletal: Negative. Skin: Negative for rash and wound. Neurological: Negative for dizziness, tremors, seizures and headaches. Psychiatric/Behavioral: Negative. Negative for confusion and decreased concentration. Theremainder of a complete 14-point review of systems is negative. Vital Signs: /62   Pulse 99   Resp 16   Wt 223 lb (101.2 kg)   LMP  (LMP Unknown)   SpO2 95%   BMI 37.11 kg/m²      Wt Readings from Last 3 Encounters:   03/04/20 223 lb (101.2 kg)   02/20/20 220 lb (99.8 kg)   01/20/20 225 lb (102.1 kg)     Body mass index is 37.11 kg/m².   LABS:  Hemoglobin A1C   Date Value Ref Range Status   01/15/2020 6.1 (H) 4.8 - 5.9 % Final   09/17/2019 5.9 4.8 - 5.9 % Final     No results found for: Nitin Healy  Lab Results   Component Value Date     01/15/2020    K 4.2 01/15/2020     01/15/2020    CO2 29 01/15/2020    BUN 14 01/15/2020    CREATININE 0.74 01/15/2020    GLUCOSE 121 (H) 01/15/2020    CALCIUM 9.6 01/15/2020    PROT 7.7 01/15/2020    LABALBU 4.2 01/15/2020    BILITOT 0.48 01/15/2020    ALKPHOS 85 01/15/2020    AST 24 01/15/2020    ALT 28 01/15/2020    LABGLOM >60 01/15/2020    GFRAA >60 01/15/2020     Lab Results   Component Value Date    CHOL 177 09/17/2019    CHOL 114 01/15/2019    CHOL 134 08/31/2018     Lab Results   Component Value Date    TRIG 321 (H) 09/17/2019    TRIG 191 01/15/2019    TRIG 294 (H) 08/31/2018     Lab Results   Component Value Date    HDL 35 (L) 09/17/2019    HDL 39 01/15/2019    HDL 32 (L) 08/31/2018     Lab Results   Component Value Date    LDLCHOLESTEROL 78 09/17/2019    LDLCHOLESTEROL 43 08/31/2018    LDLCHOLESTEROL 81 03/28/2018    LDLCALC 36.8 01/15/2019     Lab Results   Component Value Date    VLDL NOT REPORTED (H) 09/17/2019    VLDL 38 01/15/2019    VLDL NOT REPORTED 08/31/2018     Lab Results   Component Value Date    RAJWINDER 5.1 (H) 09/17/2019    KUNO 2.9 01/15/2019    KUNO 4.2 08/31/2018           Physical Exam  Constitutional:       Appearance: She is well-developed. Eyes:      Pupils: Pupils are equal, round, and reactive to light. Cardiovascular:      Rate and Rhythm: Normal rate and regular rhythm. Heart sounds: Normal heart sounds. Pulmonary:      Effort: Pulmonary effort is normal.      Breath sounds: Normal breath sounds. Abdominal:      General: Bowel sounds are normal.      Palpations: Abdomen is soft. Skin:     General: Skin is warm and dry. Comments: Negative for open/nonhealing wounds. Negative for lipohypertrophy. Neurological:      Mental Status: She is alert and oriented to person, place, and time. ASSESSMENT/PLAN:     Diagnosis Orders   1. Type 2 diabetes mellitus without complication, without long-term current use of insulin ([address]     2. Diabetes education, encounter for     3. Mixed hyperlipidemia     4. Class 2 severe obesity due to excess calories with serious comorbidity and body mass index (BMI) of 37.0 to 37.9 in adult ([address]     5. BMI 37.0-37.9, adult       No orders of the defined types were placed in this encounter. No orders of the defined types were placed in this encounter. Requested Prescriptions      No prescriptions requested or ordered in this encounter       1. Type 2 diabetes mellitus without complication, without long-term current use of insulin ([address]  2. Diabetes education, encounter for  - Stable  HbA1C goal is less than 7% and blood sugars show no change. - Labs reviewed includes: Most recent A1c 6.1% kidney function within normal limits microalbumin within normal limits. Repeat labs due in May.   -We discussed in great detail dietary modifications they can make to better improve their blood sugars. --Initial diabetic education completed. sure to bring glucometer/food log at next appointment. Patient was seen with total face to face time of 60 minutes. More than 50%  of this visit was counseling and education regarding her diabetes.     Electronically signed by JOSE Garcia CNP on 3/4/2020 at 1:08 PM      (Please note that portions of this note were completed with a voice-recognition program. Efforts were made to edit the dictation but occasionally words are mis-transcribed.)

## 2020-03-04 NOTE — PATIENT INSTRUCTIONS
Low carb diet 45 grams at each meal with two 15 gram snacks. Keep a food log and bring with you to the next appointment. Increase protein intake.   -carb counting maurizio     Exercise: increase physical activity gradual up to 150 minutes per week. Incorporate strength and cardio.

## 2020-03-25 PROBLEM — E78.2 MIXED HYPERLIPIDEMIA: Status: RESOLVED | Noted: 2020-01-01 | Resolved: 2020-01-01

## 2020-05-27 NOTE — PROGRESS NOTES
2020    TELEHEALTH EVALUATION -- Audio/Visual (During [id number]-56 public health emergency)    HPI:    Eyad Galicia (:  1965) has requested an audio/video evaluation for the following concern(s):    She states that she has been feeling well. She is tolerating her medications well. She states that she has been exercising regularly. She denies chest pain or shortness of breath with activity or at rest.   She feels that she is doing well with her current dose of Effexor. She reports that her current asthma regimen is working well for her. She reports that she has had some increased difficulty with insomnia during the current pandemic. She states that she has been taking Ambien every night to help her sleep. Review of Systems    Prior to Visit Medications    Medication Sig Taking? Authorizing Provider   zolpidem (AMBIEN) 5 MG tablet Take 5 mg by mouth nightly as needed for Sleep. Yes Historical Provider, MD   fluticasone (ARNUITY ELLIPTA) 200 MCG/ACT AEPB Inhale 1 puff into the lungs daily Yes Dudley Dent, DO   montelukast (SINGULAIR) 10 MG tablet Take 1 tablet by mouth nightly Yes Mando Dent, DO   venlafaxine (EFFEXOR XR) 75 MG extended release capsule Take 2 capsules by mouth daily Yes Hilario Mortimer, MD   atorvastatin (LIPITOR) 80 MG tablet Take 1 tablet by mouth daily Yes Hilario Mortimer, MD   Handicap Placard MISC by Does not apply route Yes Hilario Mortimer, MD   metFORMIN (GLUCOPHAGE-XR) 500 MG extended release tablet Take 1 tablet by mouth daily (with breakfast) Yes Hilario Mortimer, MD   UNABLE TO FIND Massage therapy once a week as needed. Diagnosis low back pain.  Yes Hilario Mortimer, MD   Ascorbic Acid (VITAMIN C) 250 MG CHEW Take 1 tablet by mouth daily Yes Historical Provider, MD   ibuprofen (ADVIL;MOTRIN) 200 MG tablet Take 200 mg by mouth every 6 hours as needed for Pain Yes Historical Provider, MD   albuterol (PROVENTIL) (2.5 MG/3ML) 0.083% nebulizer solution Take 3 mLs by nebulization every 4 hours as needed for Wheezing or Shortness of Breath Yes Chandan Encarnacion RN   albuterol (PROAIR HFA) 108 (90 BASE) MCG/ACT inhaler Inhale 2 puffs into the lungs every 4 hours as needed for Wheezing Yes Ale Palumbo MD       Social History     Tobacco Use    Smoking status: Never Smoker    Smokeless tobacco: Never Used   Substance Use Topics    Alcohol use: No     Alcohol/week: 0.0 standard drinks    Drug use: No        She has the following allergies:  Compazine [prochlorperazine] and Tape [adhesive tape]         She has the following problem list:  Patient Active Problem List   Diagnosis    [facility] lung disease ([facility]    Depression    Reactive airway disease    Tachycardia    Obesity (BMI 30-39. 9)    Post-thoracotomy pain syndrome    Hx of stem cell transplant ([facility]    History of Hodgkin's lymphoma    History of DVT (deep vein thrombosis)    Primary insomnia    Neuropathy due to chemotherapeutic drug ([facility]    High risk medication use    Myopia of both eyes with astigmatism    Type 2 diabetes mellitus without complication (HCC)    Rectal bleeding    Ischemic colitis ([facility]    bilateral iliac vein compression    Bacteremia    Hodgkin's disease ([facility]    Sarcoidosis    Thromboembolic disorder ([facility]    History of autologous stem cell transplant ([facility]    Pneumonitis    Mixed hyperlipidemia    Chronic diarrhea         PHYSICAL EXAMINATION:  [ INSTRUCTIONS:  \"[x]\" Indicates a positive item  \"[]\" Indicates a negative item  -- DELETE ALL ITEMS NOT EXAMINED]  Vital Signs: (As obtained by patient/caregiver or practitioner observation)    Vitals:    05/27/20 1456   BP: Comment: unable to assess-patient at home   Pulse: Comment: unable to assess-patient at home   Temp: Comment: unable to assess-patient at home   Weight: 214 lb (97.1 kg)  Comment: per patient   Height: Comment: unable to assess-patient at home        Constitutional:   She appears well-developed and well-nourished.   She is in no

## 2020-07-28 NOTE — TELEPHONE ENCOUNTER
Needs appt thereafter for f/u
Pt seen last May 2019 - was asked to call our office once she completed her sleep study to scheduled f/u appt. Pt never completed sleep study, never scheduled f/u appt. She is now requesting refills on Singulair- 3 mo to [facility]   Your first available appt is in Oct/Nov - do you want to fill this for her or have her contact PCP? Sleep order from May has .
no

## 2020-09-08 NOTE — PROGRESS NOTES
take steroids unless she has a respiratory infection. She is highly motivated to prevent or delay progression of her diabetes. 09/08/20   She has been working on diet. Diet: fresh fruits and veggies, limits sweets   Exercise: bike riding   BS testing: not testing   Issues: would like to increase physical activity     High cholesterol-  Takes lipitor and denies any adverse effects with its use. Watches diet and exercise. Obesity- Working on weight loss. Past Medical History:   Diagnosis Date    Adrenal insufficiency (HCC)     Atypical chest pain     with normal cardiolyte study    Cough     History of postinfectious cough    Depression     DVT (deep venous thrombosis) (HCC)     associated with left subclavian infusion port    Dyspnea     multifactorial    Herpes zoster     history of     Hilar adenopathy     histologically demonstrated in noncaceating granulomas, significance is not clear    History of colitis     most likely infectious    History of Hodgkin's lymphoma     recurrent hodgkin lymphoma, right hilum, in remission    Hx of stem cell transplant ([address] 2006    engraftment syndrome    Hyperlipidemia     Insomnia     Interstitial lung disease ([address]     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 Herson Aden.    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 drug ([address]     bilateral neuropathy in feet    Obesity     Obstructive sleep apnea on CPAP     compliant    Peripheral polyneuropathy     Post-thoracotomy pain syndrome     Reactive airway disease     Tachycardia, unspecified     Upper respiratory tract infection      Family History   Problem Relation Age of Onset    Coronary Art Dis Father     Heart Disease Father  Hypertension Mother     Cataracts Mother     Glaucoma Neg Hx     Cancer Neg Hx     Diabetes Neg Hx      Social History     Tobacco Use    Smoking status: Never Smoker    Smokeless tobacco: Never Used   Substance Use Topics    Alcohol use: No     Alcohol/week: 0.0 standard drinks    Drug use: No     Allergies   Allergen Reactions    Compazine [Prochlorperazine] Other (See Comments)     Felt shaky.  Tape Lyn Clayton Tape] Rash       MEDICATIONS:  Current Outpatient Medications   Medication Sig Dispense Refill    montelukast (SINGULAIR) 10 MG tablet Take 1 tablet by mouth nightly 90 tablet 3    venlafaxine (EFFEXOR XR) 75 MG extended release capsule Take 2 capsules by mouth daily 180 capsule 1    atorvastatin (LIPITOR) 80 MG tablet Take 1 tablet by mouth daily 90 tablet 1    metFORMIN (GLUCOPHAGE-XR) 500 MG extended release tablet Take 1 tablet by mouth daily (with breakfast) 90 tablet 1    UNABLE TO FIND Blood pressure monitor. Check blood pressure twice a week. 1 Device 0    fluticasone (ARNUITY ELLIPTA) 200 MCG/ACT AEPB Inhale 1 puff into the lungs daily 30 each 11    Handicap Placard MISC by Does not apply route 1 each 0    UNABLE TO FIND Massage therapy once a week as needed. Diagnosis low back pain. 1 each 0    Ascorbic Acid (VITAMIN C) 250 MG CHEW Take 1 tablet by mouth daily      ibuprofen (ADVIL;MOTRIN) 200 MG tablet Take 200 mg by mouth every 6 hours as needed for Pain      albuterol (PROVENTIL) (2.5 MG/3ML) 0.083% nebulizer solution Take 3 mLs by nebulization every 4 hours as needed for Wheezing or Shortness of Breath 120 each 0    albuterol (PROAIR HFA) 108 (90 BASE) MCG/ACT inhaler Inhale 2 puffs into the lungs every 4 hours as needed for Wheezing 3 Inhaler 1     No current facility-administered medications for this visit. Review ofSymptoms:  Review of Systems   Constitutional: Negative for unexpected weight change. Eyes: Negative for visual disturbance.    Respiratory: Negative. Negative for shortness of breath. Cardiovascular: Negative for chest pain and leg swelling. Gastrointestinal: Negative for abdominal pain and diarrhea. Endocrine: Negative for polydipsia, polyphagia and polyuria. Genitourinary: Negative. Musculoskeletal: Negative. Skin: Negative for rash and wound. Neurological: Negative for dizziness, tremors, seizures and headaches. Psychiatric/Behavioral: Negative. Negative for confusion and decreased concentration. Theremainder of a complete 14-point review of systems is negative. Vital Signs: BP (!) 128/90   Pulse 93   Resp 16   Wt 222 lb (100.7 kg)   LMP  (LMP Unknown)   SpO2 94%   BMI 36.94 kg/m²      Wt Readings from Last 3 Encounters:   09/08/20 222 lb (100.7 kg)   05/27/20 214 lb (97.1 kg)   03/04/20 223 lb (101.2 kg)     Body mass index is 36.94 kg/m².   LABS:  Hemoglobin A1C   Date Value Ref Range Status   05/26/2020 5.6 4.8 - 5.9 % Final   01/15/2020 6.1 (H) 4.8 - 5.9 % Final     Lab Results   Component Value Date    LABMICR CANNOT BE CALCULATED 05/26/2020     Lab Results   Component Value Date     05/26/2020    K 4.1 05/26/2020     05/26/2020    CO2 27 05/26/2020    BUN 14 05/26/2020    CREATININE 0.53 05/26/2020    GLUCOSE 129 (H) 05/26/2020    CALCIUM 9.1 05/26/2020    PROT 7.4 05/26/2020    LABALBU 4.1 05/26/2020    BILITOT 0.42 05/26/2020    ALKPHOS 95 05/26/2020    AST 29 05/26/2020    ALT 30 05/26/2020    LABGLOM >60 05/26/2020    GFRAA >60 05/26/2020     Lab Results   Component Value Date    CHOL 169 05/26/2020    CHOL 177 09/17/2019    CHOL 114 01/15/2019     Lab Results   Component Value Date    TRIG 318 (H) 05/26/2020    TRIG 321 (H) 09/17/2019    TRIG 191 01/15/2019     Lab Results   Component Value Date    HDL 40 (L) 05/26/2020    HDL 35 (L) 09/17/2019    HDL 39 01/15/2019     Lab Results   Component Value Date    LDLCHOLESTEROL 65 05/26/2020    LDLCHOLESTEROL 78 09/17/2019    LDLCHOLESTEROL 43 08/31/2018    LDLCALC 36.8 01/15/2019     Lab Results   Component Value Date    VLDL NOT REPORTED (H) 05/26/2020    VLDL NOT REPORTED (H) 09/17/2019    VLDL 38 01/15/2019     Lab Results   Component Value Date    CHOLHDLRATIO 4.2 05/26/2020    CHOLHDLRATIO 5.1 (H) 09/17/2019    CHOLHDLRATIO 2.9 01/15/2019           Physical Exam  Constitutional:       Appearance: She is well-developed. Eyes:      Pupils: Pupils are equal, round, and reactive to light. Cardiovascular:      Rate and Rhythm: Normal rate and regular rhythm. Heart sounds: Normal heart sounds. Pulmonary:      Effort: Pulmonary effort is normal.      Breath sounds: Normal breath sounds. Abdominal:      General: Bowel sounds are normal.      Palpations: Abdomen is soft. Skin:     General: Skin is warm and dry. Comments: Negative for open/nonhealing wounds. Negative for lipohypertrophy. Neurological:      Mental Status: She is alert and oriented to person, place, and time. ASSESSMENT/PLAN:     Diagnosis Orders   1. Type 2 diabetes mellitus without complication, without long-term current use of insulin ([address]     2. Diabetes education, encounter for     3. Mixed hyperlipidemia     4. BMI 36.0-36.9,adult     5. Class 2 severe obesity due to excess calories with serious comorbidity and body mass index (BMI) of 36.0 to 36.9 in adult [facility])       No orders of the defined types were placed in this encounter. No orders of the defined types were placed in this encounter. Requested Prescriptions      No prescriptions requested or ordered in this encounter       1. Type 2 diabetes mellitus without complication, without long-term current use of insulin ([address]  2. Diabetes education, encounter for  - Stable  HbA1C goal is less than 7% and blood sugars show no change.  -not testing  -Diabetic foot exam up-to-date: Yes  -Diabetic retinal exam up-to-date: Yes  - Labs reviewed includes: most recent a1c 5.6% gfr >60. Repeat labs now. -We discussed in great detail dietary modifications they can make to better improve their blood sugars. -follow up diabetes education completed, all questions answered.  -continue to work on diet and exercise, will increase metformin and start counting carbs if a1c is elevated. Discussed signs and symptoms of hyper/hypoglycemia and how to treat. Encouraged 150 minutes of physical activity per week. Follow a low carbohydrate diet consuming 45 grams of carbohydrates at breakfast, lunch and dinner with two separate snacks [id number] grams of carbohydrates. Encouraged at least 7 hours of sleep. The patient was informed of the goals of diabetes management. This can only be accomplished by watching their diet and exercise levels. We certainly use medicines to help attain these goals. The consequences of not controlling blood sugars were discussed. These include blindness, heart disease, stroke, kidney disease, and possibly need for dialysis. They were told to be careful with their foot care as diabetics often have nerve damage, infections and risk for limb amutations . They also need a dilated eye exam yearly. We discussed the issues of diet, exercise, medication, complication avoidance, reviewed the signs and symptoms of diabetes, hypoglycemic episodes, significance of HbA1C.       3. Mixed hyperlipidemia  stable, lipid panel reviewed, continue current medications. Diet and exercise      4. BMI 36.0-36.9,adult  5. Class 2 severe obesity due to excess calories with serious comorbidity and body mass index (BMI) of 36.0 to 36.9 in adult [facility])  Reduce calories and increase physical activity to achieve a slow and steady weight loss to improve blood pressure, cholesterol and diabetes. Answered all patient questions. Agrees to follow plan of care and to follow up in 6 months, sooner if needed. Call office if unexplained blood sugars less than 70 occur or above 400.  [facility] with [facility] further questions or concerns. Be sure to bring glucometer/food log at next appointment. Patient was seen with total face to face time of 30 minutes. More than 50%  of this visit was counseling and education regarding her diabetes.     Electronically signed by JOSE Dobbs CNP on 9/8/2020 at 10:32 AM      (Please note that portions of this note were completed with a voice-recognition program. Efforts were made to edit the dictation but occasionally words are mis-transcribed.)

## 2020-10-13 PROBLEM — E11.39 TYPE 2 DIABETES MELLITUS WITH OTHER DIABETIC OPHTHALMIC COMPLICATION (HCC): Status: ACTIVE | Noted: 2020-01-01

## 2020-10-13 NOTE — PROGRESS NOTES
(101.7 kg)   Height: 5' 6\" (1.676 m)     Body mass index is 36.17 kg/m². Obese  female, healthy-appearing, alert, cooperative and in no distress. Neck supple. No adenopathy. Thyroid symmetric, normal size. Chest:  Normal expansion. Clear to auscultation. No rales, rhonchi, or wheezing. Heart sounds are normal.  Regular rate and rhythm without murmur, gallop or rub. Lower extremities have no edema. Her feet were examined. There were no areas of redness, ulceration, or skin breakdown. There was normal sensation to light touch of the feet bilaterally. The pulses in the feet and ankles were normal.   Affect is bright. Thought processes are logical. There is no evidence of paranoia or delusions. Responses to questions are appropriate. Dress and grooming are appropriate.      Labs done 10/8/2020 were reviewed with the patient:   Hospital Outpatient Visit on 10/08/2020   Component Date Value Ref Range Status    Hemoglobin A1C 10/08/2020 5.7  4.8 - 5.9 % Final    Estimated Avg Glucose 10/08/2020 117  mg/dL Final    Glucose 10/08/2020 129* 70 - 99 mg/dL Final    BUN 10/08/2020 16  6 - 20 mg/dL Final    CREATININE 10/08/2020 0.64  0.50 - 0.90 mg/dL Final    Bun/Cre Ratio 10/08/2020 25* 9 - 20 Final    Calcium 10/08/2020 9.7  8.6 - 10.4 mg/dL Final    Sodium 10/08/2020 139  135 - 144 mmol/L Final    Potassium 10/08/2020 4.2  3.7 - 5.3 mmol/L Final    Chloride 10/08/2020 100  98 - 107 mmol/L Final    CO2 10/08/2020 28  20 - 31 mmol/L Final    Anion Gap 10/08/2020 11  9 - 17 mmol/L Final    Alkaline Phosphatase 10/08/2020 99  35 - 104 U/L Final    ALT 10/08/2020 29  5 - 33 U/L Final    AST 10/08/2020 26  <32 U/L Final    Total Bilirubin 10/08/2020 0.43  0.3 - 1.2 mg/dL Final    Total Protein 10/08/2020 7.3  6.4 - 8.3 g/dL Final    Alb 10/08/2020 4.3  3.5 - 5.2 g/dL Final    Albumin/Globulin Ratio 10/08/2020 1.4  1.0 - 2.5 Final    GFR Non- 10/08/2020 >60  >60 mL/min feet.    7.  Routine health maintenance  Health maintenance was reviewed with the patient. Annual influenza vaccine was recommended and ordered. Shingrix and Hepatitis B vaccines were recommended. She plans to check her insurance plan to see if these are covered.       (Please note that portions of this note were completed with a voice-recognition program. Efforts were made to edit the dictation but occasionally words are mis-transcribed.)

## 2020-10-13 NOTE — PROGRESS NOTES
Patient refused HIV testing. She is unsure about Hepatitis B vaccine at this time. Since her insurance had changed recently she will check into the shingles vaccine.

## 2020-10-13 NOTE — LETTER
[facility] A department of [place] [address]  Phone: [phone]  Fax: [phone]    Matti Ulloa MD        October 13, 2020    [address] [id number]      To whom it may concern,     Lea Valenzuela is a patient of mine who has chemotherapy-induced interstitial lung disease. This condition causes her difficulty in breathing. She requests that I send a letter so that she can have her leaves picked up.       Please call if there are any questions or concern.         Sincerely,      Matti Ulloa MD

## 2020-10-14 NOTE — PROGRESS NOTES
Subjective:      Patient ID: Pilar Cotter is a 48 y.o. female. HPI:    Patient here for follow-up for asthma and sleep apnea. Patient was last seen in the office in May 2019 per Dr. Elsa Berry. Patient was previously diagnosed with EUGENE and was on CPAP however machine was confiscated due to loss of insurance. Patient is planning to leave next week for [place] for 3 months to help take care of her grandchildren. Asking about repeating sleep study in order to get back on her CPAP. Split-night sleep study ordered will need to coordinate with her travels in order to get testing completed and her connected with a machine. Medications:   Arnuity 200 mcg:  Singulair 10 mg daily:  Albuterol HFA: Not needing  Albuterol nebulizer: Not needing    PRIOR WORKUP:  PFT 5/7/2010: FVC 2.53 (67% of predicted), FEV1 2.21 (75% of predicted). FEV1/FVC 87.16 (113% of predicted), FEF 25-75 3.20 (99% of predicted), lung volumes by plethysmography ERV 0.56 (47% of predicted), RV 0.88 (45% of predicted), TLC 3.43 (60% of predicted), DSB 14.74 (68% of predicted). Final impression: Restrictive defect of extrapulmonic type. CT Imaging:  CT chest 6/19/2017: Volume loss in the right lung with scarring and atelectasis medially. There is moderate elevation of the left hemidiaphragm with no nodules or masses in the lungs. No segmental consolidations or pleural effusions. CT chest 11/28/2016: Trachea and main bronchi were RN negative for any abnormality. Esophagus is unremarkable. Mildly prominent paratracheal lymph nodes and subcarinal lymph nodes. Largest is in the subcarinal region measuring 2.3 x 1 cm. Lungs are negative for any evidence of mass, pleural effusion or pneumothorax. Sleep Study 8/23/2011: Patient had a total of 15 obstructive apneas and 95 hypopneas with an apnea hypopnea index of 19 events per hour. Sleep study revealing a moderate degree of obstructive sleep apnea. Recommended for titration study. Patient developed complex sleep apnea and response to treatment with CPAP/BiPAP however the use of adaptive servo ventilation was effective in resolving both central and obstructive events. It is recommended that the patient proceed with home use of servo ventilation with a pressure setting of 7 cm H2O. Immunizations:   Immunization History   Administered Date(s) Administered    Influenza Virus Vaccine 10/22/2008, 10/01/2013, 11/01/2014, 10/01/2015, 11/01/2016, 11/01/2017, 10/01/2018, 10/01/2019    Influenza, Quadv, IM, PF (6 mo and older Fluzone, Flulaval, Fluarix, and 3 yrs and older Afluria) 10/13/2020    Pneumococcal Conjugate 13-valent ([id number]) 08/05/2015    Pneumococcal Conjugate 7-valent (Prevnar7) 01/01/2006    Pneumococcal Polysaccharide ([id number]) 07/26/2013, 09/18/2019    Tdap (Boostrix, Adacel) 01/01/2006, 09/05/2018        No flowsheet data found. BP (!) 136/100   Pulse 100   Temp 97.5 °F (36.4 °C)   Ht 5' 6\" (1.676 m)   Wt 226 lb 3.2 oz (102.6 kg)   LMP  (LMP Unknown)   SpO2 96%   BMI 36.51 kg/m²     Past Medical History:   Diagnosis Date    Adrenal insufficiency (HCC)     Atypical chest pain     with normal cardiolyte study    Cough     History of postinfectious cough    Depression     DVT (deep venous thrombosis) (HCC)     associated with left subclavian infusion port    Dyspnea     multifactorial    Herpes zoster     history of     Hilar adenopathy     histologically demonstrated in noncaceating granulomas, significance is not clear    History of colitis     most likely infectious    History of Hodgkin's lymphoma     recurrent hodgkin lymphoma, right hilum, in remission    Hx of stem cell transplant ([address] 2006    engraftment syndrome    Hyperlipidemia     Insomnia     Interstitial lung disease ([address]     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 Afton Merlin.    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 drug ([address]     bilateral neuropathy in feet    Obesity     Obstructive sleep apnea on CPAP     compliant    Peripheral polyneuropathy     Post-thoracotomy pain syndrome     Reactive airway disease     Tachycardia, unspecified     Upper respiratory tract infection      Past Surgical History:   Procedure Laterality Date    APPENDECTOMY      BLADDER SUSPENSION  5/12/2010    mesh mid urethral suspension at the time of her hysterectomy    BRONCHOSCOPY  08/2007, 2/2007, 06/2006    CARDIAC CATHETERIZATION  3/2/2011    normal    COLONOSCOPY  3/7/2013    normal; repeat 10 years, done at [facility].  (The dictated report is filed under the Media tab.)    COLONOSCOPY  02/06/2016    ISCHEMIC COLITIS ULCERATION OF DESCENDING AND SIGMOID COLON, Dr. Xavi Rodríguez, [facility], repeat recommended in 5 years    COLONOSCOPY N/A 2/20/2020    COLONOSCOPY WITH BIOPSY performed by Yamil Kaufman DO at [address] [place] [address] [address]kw[address]  05/1997    LYMPH NODE BIOPSY  4/23/2010    mediastinal lymph node    LYMPH NODE BIOPSY  04/23/2010    MEDIASTINAL     MEDIASTINOTOMY  2/14/2006, 12/2005    CHAMBERLAIN PROCEDURE    OTHER SURGICAL HISTORY  JANUARY 2006    REMOVAL OF SUBCLAVIAN PORT    OTHER SURGICAL HISTORY      Ovarian cystectomy    OTHER SURGICAL HISTORY  02/19/2019    [employer] - of inferior vena janette, bilateral common iliac veins bilateral exteranal iliac vein and bilateral common femoral veins    LEANNE AND BSO  5/12/2010    laparoscopic assisted vaginal hysterectomy with bilateral salpingo-oopherectomy and anterior repair; mesh mid urethral suspension    THORACOSCOPY  07/24/2006    TUNNELED VENOUS PORT PLACEMENT  12-    INSERTION OF LEFT SUBCLAVIAN INFUSION PORT    UPPER GASTROINTESTINAL ENDOSCOPY  12/2004 & 3/8/2011    with esophageal dilation on 3/8/2011     Family History   Problem Relation Age of Onset    Coronary Art Dis Father     Heart Disease Father     Hypertension Mother     Cataracts Mother     Glaucoma Neg Hx     Cancer Neg Hx     Diabetes Neg Hx        Social History     Socioeconomic History    Marital status:      Spouse name: Not on file    Number of children: Not on file    Years of education: Not on file    Highest education level: Not on file   Occupational History    Not on file   Social Needs    Financial resource strain: Not on file    Food insecurity     Worry: Not on file     Inability: Not on file    Transportation needs     Medical: Not on file     Non-medical: Not on file   Tobacco Use    Smoking status: Never Smoker    Smokeless tobacco: Never Used   Substance and Sexual Activity    Alcohol use: No     Alcohol/week: 0.0 standard drinks    Drug use: No    Sexual activity: Not on file   Lifestyle    Physical activity     Days per week: Not on file     Minutes per session: Not on file    Stress: Not on file   Relationships    Social connections     Talks on phone: Not on file     Gets together: Not on file     Attends Alevism service: Not on file     Active member of club or organization: Not on file     Attends meetings of clubs or organizations: Not on file     Relationship status: Not on file    Intimate partner violence     Fear of current or ex partner: Not on file     Emotionally abused: Not on file     Physically abused: Not on file     Forced sexual activity: Not on file   Other Topics Concern    Not on file   Social History Narrative    Not on file       Review of Systems   Constitutional: Negative for fatigue and fever. HENT: Negative for postnasal drip, rhinorrhea, sinus pressure and sinus pain. Respiratory: Negative for cough, chest tightness, wheezing and stridor.          Endorses shortness of breath with exertion-carrying laundry up a flight of stairs. Cardiovascular: Negative for chest pain and leg swelling. Gastrointestinal: Negative for constipation, diarrhea, nausea and vomiting. Genitourinary: Negative for dysuria, frequency and urgency. Musculoskeletal: Negative for arthralgias, joint swelling and myalgias. Skin: Negative for rash. Neurological: Negative for dizziness, speech difficulty and headaches. Hematological: Does not bruise/bleed easily. Psychiatric/Behavioral: Negative for agitation, hallucinations, sleep disturbance and suicidal ideas.        Objective:       Physical Exam  General appearance - alert, well appearing, and in no distress, oriented to person, place, and time and overweight  Mental status - alert, oriented to person, place, and time, normal mood, behavior, speech, dress, motor activity, and thought processes  Eyes - pupils equal and reactive, extraocular eye movements intact  Ears - not examined  Nose - normal and patent, no erythema, discharge or polyps  Mouth - mucous membranes moist, pharynx normal without lesions  Neck - supple, no significant adenopathy  Chest - clear to auscultation, no wheezes, rales or rhonchi, symmetric air entry  Heart -normal rate, regular rhythm, normal S1, S2, no murmurs, rubs, clicks or gallops  Abdomen - soft, nontender, nondistended, no masses or organomegaly  Neuro- alert, oriented, normal speech, no focal findings or movement disorder noted}  Extremities - peripheral pulses normal, no pedal edema, no clubbing or cyanosis  Skin - normal coloration and turgor, no rashes, no suspicious skin lesions noted     Wt Readings from Last 3 Encounters:   10/14/20 226 lb 3.2 oz (102.6 kg)   10/13/20 224 lb 1.6 oz (101.7 kg)   09/08/20 222 lb (100.7 kg)       Results for orders placed or performed during the hospital encounter of 10/08/20   Hemoglobin A1C   Result Value Ref Range    Hemoglobin A1C 5.7 4.8 - 5.9 %    Estimated Avg Glucose 117 mg/dL   Comprehensive Metabolic Panel   Result Value Ref Range    Glucose 129 (H) 70 - 99 mg/dL    BUN 16 6 - 20 mg/dL    CREATININE 0.64 0.50 - 0.90 mg/dL    Bun/Cre Ratio 25 (H) 9 - 20    Calcium 9.7 8.6 - 10.4 mg/dL    Sodium 139 135 - 144 mmol/L    Potassium 4.2 3.7 - 5.3 mmol/L    Chloride 100 98 - 107 mmol/L    CO2 28 20 - 31 mmol/L    Anion Gap 11 9 - 17 mmol/L    Alkaline Phosphatase 99 35 - 104 U/L    ALT 29 5 - 33 U/L    AST 26 <32 U/L    Total Bilirubin 0.43 0.3 - 1.2 mg/dL    Total Protein 7.3 6.4 - 8.3 g/dL    Alb 4.3 3.5 - 5.2 g/dL    Albumin/Globulin Ratio 1.4 1.0 - 2.5    GFR Non-African American >60 >60 mL/min    GFR African American >60 >60 mL/min    GFR Comment          GFR Staging NOT REPORTED        No results found. Assessment:      1. EUGENE (obstructive sleep apnea)    2. Mild intermittent reactive airway disease without complication          Plan:      1. Medications reviewed, continue as ordered. Patient doing well on Arnuity and Singulair. Not needing her albuterol HFA/nebulizer. 2. Planning to travel to [place] at the end of the month and will return in January 2021.  3. Discussed COVID-19 strategies for mitigation  4. Educated and clarified the medication use. 5. Recommend flu vaccination in the fall annually. Up-to-date  6. Patient is up-to-date with vaccinations from pulmonary perspective. 7. Maintain an active lifestyle. 8. Patient's questions were answered to her satisfaction. 9. Pulmonary function tests were reviewed   10. Not currently on Pap therapy, previously was on adaptive servo ventilation due to her complex sleep apnea. Patient lost machine due to insurance, interested in getting back on therapy. Repeat split-night sleep study ordered.   11. We'll see the patient back in 6 months        Electronically signed by JOSE Marina CNP on 10/14/2020 at 9:31 AM

## 2020-10-21 NOTE — TELEPHONE ENCOUNTER
Anibal Kaplan called requesting a refill of the below medication which has been pended for you: [place] from [place] reviewed. Ambien 5 mg last filled filled 5/27/20 #90/90 days. Report available for your review. Requested Prescriptions     Pending Prescriptions Disp Refills    zolpidem (AMBIEN) 5 MG tablet 90 tablet 0     Sig: Take 0.5-1 tablets by mouth nightly as needed for Sleep for up to 90 days. Last Appointment Date: 10/13/2020  Next Appointment Date: 2/16/2021    Allergies   Allergen Reactions    Compazine [Prochlorperazine] Other (See Comments)     Felt shaky.     Tape Sonia Curtis Tape] Rash

## 2020-12-10 NOTE — TELEPHONE ENCOUNTER
Friday mariusz called requesting a refill of the below medication which has been pended for you:     Requested Prescriptions     Pending Prescriptions Disp Refills    atorvastatin (LIPITOR) 80 MG tablet [Pharmacy Med Name: ATORVASTATIN TABS 80MG] 90 tablet 0     Sig: TAKE 1 TABLET DAILY       Last Appointment Date: 10/13/2020  Next Appointment Date: 3/9/2021    Allergies   Allergen Reactions    Compazine [Prochlorperazine] Other (See Comments)     Felt shaky.     Tape [place] [employer] Tape] Rash

## 2021-01-01 ENCOUNTER — NURSE TRIAGE (OUTPATIENT)
Dept: OTHER | Facility: CLINIC | Age: 56
End: 2021-01-01

## 2021-01-10 NOTE — TELEPHONE ENCOUNTER
you traveled out of the country recently? \" If so, \"When and where? \"  Also ask about out-of-state travel, since the CDC has identified some high-risk cities for community spread in the [address]. Note: Travel becomes less relevant if there is widespread community transmission where the patient lives. None    Protocols used: CORONAVIRUS (COVID-19) EXPOSURE-ADULT-AH, CORONAVIRUS (COVID-19) DIAGNOSED OR SUSPECTED-ADULT-AH    Brief description of triage: Pt reports having exposure to son and wife who are covid positive. States they live in the same home. States her symptoms started 3 days ago and she is having SOB with exertion. Triage indicates for patient to be evaluated in ED. Care advice provided, patient verbalizes understanding; denies any other questions or concerns; instructed to call back for any new or worsening symptoms. This triage is a result of a call to [address] [place] [address]. Please do not respond to the triage nurse through this encounter. Any subsequent communication should be directly with the patient.

## (undated) DEVICE — 60 ML SYRINGE REGULAR TIP: Brand: MONOJECT

## (undated) DEVICE — 1200CC GUARDIAN II: Brand: GUARDIAN

## (undated) DEVICE — MERCY DEFIANCE ENDO KIT: Brand: MEDLINE INDUSTRIES, INC.

## (undated) DEVICE — FORCEPS BX L240CM JAW DIA2.4MM ORNG L CAP W/ NDL DISP RAD

## (undated) DEVICE — CONNECTOR TBNG AUX H2O JET DISP FOR OLY 160/180 SER

## (undated) DEVICE — LINE SAMP O2 6.5FT W/FEMALE CONN F/ADULT CAPNOLINE PLUS